# Patient Record
Sex: FEMALE | Race: WHITE | NOT HISPANIC OR LATINO
[De-identification: names, ages, dates, MRNs, and addresses within clinical notes are randomized per-mention and may not be internally consistent; named-entity substitution may affect disease eponyms.]

---

## 2020-08-10 PROBLEM — Z00.00 ENCOUNTER FOR PREVENTIVE HEALTH EXAMINATION: Status: ACTIVE | Noted: 2020-08-10

## 2020-08-11 ENCOUNTER — APPOINTMENT (OUTPATIENT)
Dept: SURGICAL ONCOLOGY | Facility: CLINIC | Age: 85
End: 2020-08-11
Payer: MEDICARE

## 2020-08-11 VITALS
OXYGEN SATURATION: 99 % | RESPIRATION RATE: 16 BRPM | SYSTOLIC BLOOD PRESSURE: 189 MMHG | DIASTOLIC BLOOD PRESSURE: 103 MMHG | TEMPERATURE: 98.1 F | HEART RATE: 88 BPM

## 2020-08-11 DIAGNOSIS — Z86.69 PERSONAL HISTORY OF OTHER DISEASES OF THE NERVOUS SYSTEM AND SENSE ORGANS: ICD-10-CM

## 2020-08-11 DIAGNOSIS — Z87.19 PERSONAL HISTORY OF OTHER DISEASES OF THE DIGESTIVE SYSTEM: ICD-10-CM

## 2020-08-11 DIAGNOSIS — K31.9 DISEASE OF STOMACH AND DUODENUM, UNSPECIFIED: ICD-10-CM

## 2020-08-11 DIAGNOSIS — R06.9 UNSPECIFIED ABNORMALITIES OF BREATHING: ICD-10-CM

## 2020-08-11 PROCEDURE — 99205 OFFICE O/P NEW HI 60 MIN: CPT

## 2020-08-11 RX ORDER — TRIAMCINOLONE ACETONIDE 55 MCG
AEROSOL WITH ADAPTER (GRAM) NASAL
Refills: 0 | Status: ACTIVE | COMMUNITY

## 2020-08-11 RX ORDER — LEVOTHYROXINE SODIUM 112 UG/1
112 TABLET ORAL
Qty: 30 | Refills: 0 | Status: ACTIVE | COMMUNITY
Start: 2020-04-13

## 2020-08-11 RX ORDER — DEXLANSOPRAZOLE 60 MG/1
60 CAPSULE, DELAYED RELEASE ORAL
Qty: 90 | Refills: 0 | Status: ACTIVE | COMMUNITY
Start: 2020-04-07

## 2020-08-11 NOTE — ASSESSMENT
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 I explained that breast cancer can be treated with 2 main surgical approaches. One is breast conserving therapy. The other is mastectomy with or without immediate reconstruction by a plastic surgeon. Breast conserving therapy involves wide excision of the involved area. Negative margins must be achieved with this wide excision. In addition, evaluation of the lymph nodes either with a sentinel lymph node biopsy or an axillary lymph node dissection may be required. This treatment usually requires postoperative radiation to the breast. Treatment with mastectomy also involves node dissection. Postoperative radiation therapy may be needed even after mastectomy in certain advanced cases.\par \par  \par

## 2020-08-11 NOTE — HISTORY OF PRESENT ILLNESS
[de-identified] : Ms. Hennessy is a 85 year old female who presents today for an initial consultation for newly diagnosed breast cancer.  She  was referred by Dr. Jose Juan Fu.Patient had recent CT scan to follow an ascending AA . A soft tissue mass was noted in the left breast. A mammogram was performed on July 27 which identified a 2.5 cm suspicious mass. In retrospect this was not felt by the patient.\par \par She underwent a biopsy of the left breast 2:00 revealing invasive ductal carcinoma, moderately differentiated (Er+Pr+Her2-).\par \par \par \par

## 2020-08-11 NOTE — CONSULT LETTER
[Consult Letter:] : I had the pleasure of evaluating your patient, [unfilled]. [Dear  ___] : Dear  [unfilled], [Please see my note below.] : Please see my note below. [Consult Closing:] : Thank you very much for allowing me to participate in the care of this patient.  If you have any questions, please do not hesitate to contact me. [Sincerely,] : Sincerely, [FreeTextEntry2] : Jose Juan Fu MD [FreeTextEntry3] : Eduardo Hurtado MD \par (O) [phone]\par (F) [phone]

## 2020-08-11 NOTE — PHYSICAL EXAM
[Normal] : supple, no neck mass and thyroid not enlarged [Normal Supraclavicular Lymph Nodes] : normal supraclavicular lymph nodes [Normal Axillary Lymph Nodes] : normal axillary lymph nodes [Normal] : oriented to person, place and time, with appropriate affect [FreeTextEntry1] : DT present for exam.  [de-identified] : Normal S1, S2. Regular rate and rhythm. [de-identified] : Complete breast exam performed in supine and upright positions. Examination of the right breast was essentially within normal limits. 1:00 axis of the left breast a firm very reported mass was identified with overlying ecchymosis secondary to biopsy .   [de-identified] : Clear breath sounds bilaterally, normal respiratory effort.

## 2020-08-28 ENCOUNTER — OUTPATIENT (OUTPATIENT)
Dept: OUTPATIENT SERVICES | Facility: HOSPITAL | Age: 85
LOS: 1 days | End: 2020-08-28
Payer: MEDICARE

## 2020-08-28 DIAGNOSIS — C50.919 MALIGNANT NEOPLASM OF UNSPECIFIED SITE OF UNSPECIFIED FEMALE BREAST: ICD-10-CM

## 2020-09-01 ENCOUNTER — RESULT REVIEW (OUTPATIENT)
Age: 85
End: 2020-09-01

## 2020-09-01 PROCEDURE — 88321 CONSLTJ&REPRT SLD PREP ELSWR: CPT

## 2020-09-17 ENCOUNTER — APPOINTMENT (OUTPATIENT)
Dept: ULTRASOUND IMAGING | Facility: IMAGING CENTER | Age: 85
End: 2020-09-17
Payer: MEDICARE

## 2020-09-17 ENCOUNTER — OUTPATIENT (OUTPATIENT)
Dept: OUTPATIENT SERVICES | Facility: HOSPITAL | Age: 85
LOS: 1 days | End: 2020-09-17
Payer: MEDICARE

## 2020-09-17 ENCOUNTER — RESULT REVIEW (OUTPATIENT)
Age: 85
End: 2020-09-17

## 2020-09-17 VITALS
HEIGHT: 64 IN | WEIGHT: 160.06 LBS | SYSTOLIC BLOOD PRESSURE: 137 MMHG | HEART RATE: 83 BPM | RESPIRATION RATE: 14 BRPM | TEMPERATURE: 98 F | OXYGEN SATURATION: 97 % | DIASTOLIC BLOOD PRESSURE: 93 MMHG

## 2020-09-17 DIAGNOSIS — C80.1 MALIGNANT (PRIMARY) NEOPLASM, UNSPECIFIED: ICD-10-CM

## 2020-09-17 DIAGNOSIS — C50.919 MALIGNANT NEOPLASM OF UNSPECIFIED SITE OF UNSPECIFIED FEMALE BREAST: ICD-10-CM

## 2020-09-17 DIAGNOSIS — H26.9 UNSPECIFIED CATARACT: Chronic | ICD-10-CM

## 2020-09-17 LAB
ANION GAP SERPL CALC-SCNC: 12 MMO/L — SIGNIFICANT CHANGE UP (ref 7–14)
BUN SERPL-MCNC: 22 MG/DL — SIGNIFICANT CHANGE UP (ref 7–23)
CALCIUM SERPL-MCNC: 9.3 MG/DL — SIGNIFICANT CHANGE UP (ref 8.4–10.5)
CHLORIDE SERPL-SCNC: 101 MMOL/L — SIGNIFICANT CHANGE UP (ref 98–107)
CO2 SERPL-SCNC: 27 MMOL/L — SIGNIFICANT CHANGE UP (ref 22–31)
CREAT SERPL-MCNC: 0.66 MG/DL — SIGNIFICANT CHANGE UP (ref 0.5–1.3)
GLUCOSE SERPL-MCNC: 86 MG/DL — SIGNIFICANT CHANGE UP (ref 70–99)
HCT VFR BLD CALC: 42.7 % — SIGNIFICANT CHANGE UP (ref 34.5–45)
HGB BLD-MCNC: 13.4 G/DL — SIGNIFICANT CHANGE UP (ref 11.5–15.5)
MCHC RBC-ENTMCNC: 30.4 PG — SIGNIFICANT CHANGE UP (ref 27–34)
MCHC RBC-ENTMCNC: 31.4 % — LOW (ref 32–36)
MCV RBC AUTO: 96.8 FL — SIGNIFICANT CHANGE UP (ref 80–100)
NRBC # FLD: 0 K/UL — SIGNIFICANT CHANGE UP (ref 0–0)
PLATELET # BLD AUTO: 245 K/UL — SIGNIFICANT CHANGE UP (ref 150–400)
PMV BLD: 10.1 FL — SIGNIFICANT CHANGE UP (ref 7–13)
POTASSIUM SERPL-MCNC: 4.2 MMOL/L — SIGNIFICANT CHANGE UP (ref 3.5–5.3)
POTASSIUM SERPL-SCNC: 4.2 MMOL/L — SIGNIFICANT CHANGE UP (ref 3.5–5.3)
RBC # BLD: 4.41 M/UL — SIGNIFICANT CHANGE UP (ref 3.8–5.2)
RBC # FLD: 13.4 % — SIGNIFICANT CHANGE UP (ref 10.3–14.5)
SODIUM SERPL-SCNC: 140 MMOL/L — SIGNIFICANT CHANGE UP (ref 135–145)
WBC # BLD: 5.28 K/UL — SIGNIFICANT CHANGE UP (ref 3.8–10.5)
WBC # FLD AUTO: 5.28 K/UL — SIGNIFICANT CHANGE UP (ref 3.8–10.5)

## 2020-09-17 PROCEDURE — 19285 PERQ DEV BREAST 1ST US IMAG: CPT | Mod: LT

## 2020-09-17 PROCEDURE — C1739: CPT

## 2020-09-17 PROCEDURE — 19285 PERQ DEV BREAST 1ST US IMAG: CPT

## 2020-09-17 PROCEDURE — 93010 ELECTROCARDIOGRAM REPORT: CPT

## 2020-09-17 NOTE — H&P PST ADULT - MARITAL STATUS
3 children, mother  89y/o brain tumor, father  79y/o lung problem , 1 sister  70y/o s/p valve replacement developed staph infection/

## 2020-09-17 NOTE — H&P PST ADULT - NEUROLOGICAL COMMENTS
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

## 2020-09-17 NOTE — H&P PST ADULT - GASTROINTESTINAL DETAILS
no masses palpable/no guarding/no organomegaly/no rebound tenderness/bowel sounds normal/no bruit/no rigidity/no distention/soft/nontender
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
## 2020-09-17 NOTE — H&P PST ADULT - NEGATIVE GENERAL SYMPTOMS
no polydipsia/no fatigue/no malaise/no sweating/no anorexia/no weight loss/no fever/no chills/no polyphagia/no polyuria

## 2020-09-17 NOTE — H&P PST ADULT - HISTORY OF PRESENT ILLNESS
86y/o female scheduled for left breast lumpectomy post adam  placement left axillary sentinel lymph node biopsy possible dissection on 9/24/2020.  Pt states, "hx of aortic aneurysm has routine cat scan, 7/2020 showed left lump breast lump, bx positive for malignancy."

## 2020-09-17 NOTE — H&P PST ADULT - NEGATIVE BREAST SYMPTOMS
no breast tenderness R/no nipple discharge R/no breast lump R/no breast tenderness L/no breast lump L/no nipple discharge L

## 2020-09-17 NOTE — H&P PST ADULT - RS GEN PE MLT RESP DETAILS PC
clear to auscultation bilaterally/good air movement/breath sounds equal/no chest wall tenderness/no rhonchi/no wheezes/no rales/no intercostal retractions/respirations non-labored

## 2020-09-17 NOTE — H&P PST ADULT - NSICDXPASTMEDICALHX_GEN_ALL_CORE_FT
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

## 2020-09-17 NOTE — H&P PST ADULT - NEGATIVE GENERAL GENITOURINARY SYMPTOMS
no flank pain L/normal urinary frequency/no flank pain R/no bladder infections/no hematuria/no dysuria

## 2020-09-17 NOTE — H&P PST ADULT - NSICDXPROBLEM_GEN_ALL_CORE_FT
PROBLEM DIAGNOSES  Problem: Malignant neoplasm  Assessment and Plan: Pt scheduled for left breast lumpectomy post adam  placement left axillary sentinel lymph node biopsy possible dissection on 9/24/2020.  labs done results pending, ekg done.  Hibiclens provided: written and verbal instructions given with teach back, able to verbalize understanding.  Preop teaching done, pt able to verbalize understanding.        PROBLEM DIAGNOSES  Problem: Malignant neoplasm  Assessment and Plan: Pt scheduled for left breast lumpectomy post adam  placement left axillary sentinel lymph node biopsy possible dissection on 9/24/2020.  labs done results pending, ekg done.  Hibiclens provided: written and verbal instructions given with teach back, able to verbalize understanding.  Preop teaching done, pt able to verbalize understanding.   meds day of surgery synthroid, propranolol, dexilant zyrtec  OR booking notified of sulfa and PCN allergy

## 2020-09-18 PROBLEM — C80.1 MALIGNANT (PRIMARY) NEOPLASM, UNSPECIFIED: Chronic | Status: ACTIVE | Noted: 2020-09-17

## 2020-09-18 PROBLEM — R25.1 TREMOR, UNSPECIFIED: Chronic | Status: ACTIVE | Noted: 2020-09-17

## 2020-09-18 PROBLEM — Z86.19 PERSONAL HISTORY OF OTHER INFECTIOUS AND PARASITIC DISEASES: Chronic | Status: ACTIVE | Noted: 2020-09-17

## 2020-09-21 ENCOUNTER — APPOINTMENT (OUTPATIENT)
Dept: DISASTER EMERGENCY | Facility: CLINIC | Age: 85
End: 2020-09-21

## 2020-09-21 ENCOUNTER — APPOINTMENT (OUTPATIENT)
Dept: SURGICAL ONCOLOGY | Facility: CLINIC | Age: 85
End: 2020-09-21

## 2020-09-21 DIAGNOSIS — Z01.818 ENCOUNTER FOR OTHER PREPROCEDURAL EXAMINATION: ICD-10-CM

## 2020-09-21 PROBLEM — M50.20 OTHER CERVICAL DISC DISPLACEMENT, UNSPECIFIED CERVICAL REGION: Chronic | Status: ACTIVE | Noted: 2020-09-17

## 2020-09-21 PROBLEM — B02.29 OTHER POSTHERPETIC NERVOUS SYSTEM INVOLVEMENT: Chronic | Status: ACTIVE | Noted: 2020-09-17

## 2020-09-21 PROBLEM — M51.26 OTHER INTERVERTEBRAL DISC DISPLACEMENT, LUMBAR REGION: Chronic | Status: ACTIVE | Noted: 2020-09-17

## 2020-09-22 LAB — SARS-COV-2 N GENE NPH QL NAA+PROBE: NOT DETECTED

## 2020-09-23 ENCOUNTER — RESULT REVIEW (OUTPATIENT)
Age: 85
End: 2020-09-23

## 2020-09-23 VITALS
WEIGHT: 160.06 LBS | TEMPERATURE: 98 F | SYSTOLIC BLOOD PRESSURE: 170 MMHG | HEART RATE: 80 BPM | DIASTOLIC BLOOD PRESSURE: 84 MMHG | OXYGEN SATURATION: 97 % | HEIGHT: 64 IN | RESPIRATION RATE: 14 BRPM

## 2020-09-24 ENCOUNTER — RESULT REVIEW (OUTPATIENT)
Age: 85
End: 2020-09-24

## 2020-09-24 ENCOUNTER — APPOINTMENT (OUTPATIENT)
Dept: MAMMOGRAPHY | Facility: IMAGING CENTER | Age: 85
End: 2020-09-24
Payer: MEDICARE

## 2020-09-24 ENCOUNTER — APPOINTMENT (OUTPATIENT)
Dept: SURGICAL ONCOLOGY | Facility: AMBULATORY SURGERY CENTER | Age: 85
End: 2020-09-24

## 2020-09-24 ENCOUNTER — OUTPATIENT (OUTPATIENT)
Dept: OUTPATIENT SERVICES | Facility: HOSPITAL | Age: 85
LOS: 1 days | End: 2020-09-24
Payer: MEDICARE

## 2020-09-24 ENCOUNTER — APPOINTMENT (OUTPATIENT)
Dept: NUCLEAR MEDICINE | Facility: IMAGING CENTER | Age: 85
End: 2020-09-24
Payer: MEDICARE

## 2020-09-24 ENCOUNTER — OUTPATIENT (OUTPATIENT)
Dept: OUTPATIENT SERVICES | Facility: HOSPITAL | Age: 85
LOS: 1 days | Discharge: HOME | End: 2020-09-24
Payer: MEDICARE

## 2020-09-24 VITALS
HEART RATE: 72 BPM | RESPIRATION RATE: 14 BRPM | DIASTOLIC BLOOD PRESSURE: 67 MMHG | SYSTOLIC BLOOD PRESSURE: 123 MMHG | OXYGEN SATURATION: 98 %

## 2020-09-24 DIAGNOSIS — C50.919 MALIGNANT NEOPLASM OF UNSPECIFIED SITE OF UNSPECIFIED FEMALE BREAST: ICD-10-CM

## 2020-09-24 DIAGNOSIS — H26.9 UNSPECIFIED CATARACT: Chronic | ICD-10-CM

## 2020-09-24 PROCEDURE — 76098 X-RAY EXAM SURGICAL SPECIMEN: CPT

## 2020-09-24 PROCEDURE — 76098 X-RAY EXAM SURGICAL SPECIMEN: CPT | Mod: 26

## 2020-09-24 PROCEDURE — A9541: CPT

## 2020-09-24 PROCEDURE — 88307 TISSUE EXAM BY PATHOLOGIST: CPT | Mod: 26

## 2020-09-24 PROCEDURE — 88305 TISSUE EXAM BY PATHOLOGIST: CPT | Mod: 26

## 2020-09-24 PROCEDURE — 19301 PARTIAL MASTECTOMY: CPT | Mod: LT

## 2020-09-24 RX ORDER — PROPRANOLOL HCL 160 MG
1 CAPSULE, EXTENDED RELEASE 24HR ORAL
Qty: 0 | Refills: 0 | DISCHARGE

## 2020-09-24 RX ORDER — TRIAMCINOLONE ACETONIDE 55 MCG
1 AEROSOL, SPRAY (GRAM) NASAL
Qty: 0 | Refills: 0 | DISCHARGE

## 2020-09-24 RX ORDER — DEXLANSOPRAZOLE 30 MG/1
1 CAPSULE, DELAYED RELEASE ORAL
Qty: 0 | Refills: 0 | DISCHARGE

## 2020-09-24 RX ORDER — CETIRIZINE HYDROCHLORIDE 10 MG/1
1 TABLET ORAL
Qty: 0 | Refills: 0 | DISCHARGE

## 2020-09-24 RX ORDER — LEVOTHYROXINE SODIUM 125 MCG
1 TABLET ORAL
Qty: 0 | Refills: 0 | DISCHARGE

## 2020-09-24 NOTE — ASU DISCHARGE PLAN (ADULT/PEDIATRIC) - MEDICATION INSTRUCTIONS
Please take Tylenol every 6 hours as needed for pain. Please do not take more than 4000mg Tylenol in a 24 hour period.

## 2020-09-24 NOTE — ASU PREOP CHECKLIST - WEIGHT IN LBS
History of Present Illness


Service


Ophthalmology


Consult Requested By





Reason for Consult


left orbital fracture


Primary Care Physician


Hellen Rai MD


Diagnoses:  


History of Present Illness


88 yo F with a h/o of HTN, Hyperlipidemia, DM and dementia who is brought to 

the ED by EMS after a fall with significant left-sided facial swelling and 

hematoma.  Patient currently [facility] resident, fall was unwitnessed however patient 

does not believe she lost consciousness. CT Maxillofacial with displaced 

fracture through floor of left orbit with slight herniation of inferior oblique 

musculature, left nasal fracture. Patient complains of pain around her left eye 

but no change in vision. Pt has dementia and cannot remember if she has any 

past ocular history or surgeries.





Past Family Social History


Allergies:  


Coded Allergies:  


     Milk Containing Products (Verified  Allergy, Unknown, 4/28/18)


     codeine (Verified  Allergy, Unknown, 4/28/18)


     erythromycin base (Verified  Allergy, Unknown, 4/28/18)


     pollen extracts (Verified  Allergy, Unknown, 4/28/18)





Physical Exam


Vital Signs





Vital Signs








  Date Time  Temp Pulse Resp B/P (MAP) Pulse Ox O2 Delivery O2 Flow Rate FiO2


 


5/2/18 08:41   20 136/74 (94)    


 


5/2/18 04:00 97.9 78 18 150/86 (107) 97   


 


5/2/18 00:00 98.1 78 18 146/68 (94) 95   


 


5/1/18 20:00 98.6 69 18 154/67 (96) 97   


 


5/1/18 16:00 98.4 80 19 124/70 (88) 100   


 


5/1/18 12:00 98.4 71 19 137/64 (88) 99   








Physical Exam


Va cc at near OD 20/100, OS 20/100


EOM full OU, no diplopia


CVF unable


Pupils 2-1 no APD OU


IOP normal to palpation OU





Anterior exam


OD - normal eyelid, C/S W&Q, K clear, AC deep, pupil round, NS


OS - normal eyelid, C/S W&Q, K clear, AC deep, pupil round, NS


Result Diagram:  


4/30/18 1119                                                                   

             4/30/18 1119








Assessment and Plan


Problem List:  


(1) Fracture of left orbital floor


ICD Codes:  S02.32XA - Fracture of orbital floor, left side, initial encounter 

for closed fracture


Status:  Acute


Plan:  No ocular injury seen on exam. Patient can follow up as outpatient PRN.














Angela Roldan MD May 2, 2018 12:00 160

## 2020-09-24 NOTE — ASU DISCHARGE PLAN (ADULT/PEDIATRIC) - FOLLOW UP APPOINTMENTS
911 or go to the nearest Emergency Room [facility] ([facility]):

## 2020-09-24 NOTE — BRIEF OPERATIVE NOTE - OPERATION/FINDINGS
Patient presented for L breast lumpectomy with axillary sentinel node biopsy post-adam  reflector placement. Mass was identified with adam  marker and confirmed again intraoperatively. A radionucleotide tracer was injected and a probe was used to identify axillary lymph nodes for biopsy. The nodes were excised and sent to pathology in formalin. Sutures were used to reapproximate the axillary biopsy incision. An elliptical incision was made for L breast lumpectomy. The mass was identified and reconfirmed with adam  probe prior to excision. Additional margins were obtained from superior, medial, inferior, lateral, and deep margins and sent to pathology. The subcutaneous tissue and skin were reapproximated with suture in layers.

## 2020-09-24 NOTE — BRIEF OPERATIVE NOTE - SPECIMENS
1. axillary sentinel lymph nodes   2. L breast mass (long lateral suture, short superior suture, double deep suture)   3. superior margin   4. medial margin   5. inferior margin   6. lateral margin   7. deep margin

## 2020-09-24 NOTE — ASU DISCHARGE PLAN (ADULT/PEDIATRIC) - CALL YOUR DOCTOR IF YOU HAVE ANY OF THE FOLLOWING:
Bleeding that does not stop/Wound/Surgical Site with redness, or foul smelling discharge or pus Pain not relieved by Medications/Nausea and vomiting that does not stop/Wound/Surgical Site with redness, or foul smelling discharge or pus/Inability to tolerate liquids or foods/Swelling that gets worse/Fever greater than (need to indicate Fahrenheit or Celsius)/Bleeding that does not stop

## 2020-09-24 NOTE — ASU DISCHARGE PLAN (ADULT/PEDIATRIC) - BATHING
Do not submerge in water May shower starting tomorrow, allow water to run over but not directly on incision. Pat dry/Do not submerge in water

## 2020-09-24 NOTE — ASU DISCHARGE PLAN (ADULT/PEDIATRIC) - ASU DC SPECIAL INSTRUCTIONSFT
[facility]  CANCER  I  N  S  T  I  T  U  T E     Department of Surgery  Division of Surgical Oncology    Jono Odom M.D., LOU.POONAM.  Chief, Division of Surgical Oncology    Hardeep Herrera M.D., F.A.C.S., F.A.S.VESTA.  Associate , Department of Surgery    Sergei Mendoza M.D., F.A.C.S.  Freddy Madrid M.D., F.A.C.S.  Gil Angulo M.D., F.A.C.S.  Bo Castillo M.D., F.A.C.S.  Freddy Hector M.D., F.A.C.S.  Eduardo Hurtado M.D., F.IONAC.S.      Breast Biopsy/Lumpectomy Post-operative Instructions  1.	Supportive bra- Please bring a sports/athletic bra with you on the day of your surgery.  You will wear it home from the hospital.  You should wear the supportive bra continuously for 48 hours after the procedure, including wearing it to sleep.  Therefore, you may wear your regular bra.  You may remove the bra to shower.  The sports bra will provide support, decrease the amount of swelling at the biopsy site, and make your recovery more comfortable.    2.	Wound dressing- There is a dressing on the wound, which consists of 2 layers.  The outer layer is a clear plastic dressing (called Tegaderm) which is waterproof.  This should remain in place for 2 days post-operatively.  On the 2nd post-operative day, you should remove the clear plastic Tegaderm dressing.  Underneath the Tegaderm dressing, there are white surgical tapes (called steri strips) which are directly adherent to the skin.  These should remain on the skin, and will peel off naturally.  All of the stitches are “internal” and will dissolve naturally.    3.	Showering/Bathing- You may shower over the dressing the very next day after surgery.  Allow the water to run over the dressing, but don not scrub the area.  It is best not to sit in a bathtub or swimming pool for at least one week after surgery.    4.	Activity level- You may resume most normal daily activity as tolerated, but avoid strenuous activities such as aerobics, jogging, exercising or heavy lifting for at least 1 week after surgery.  You may return to work in 1-2 days after surgery.  You may drive as long as you are not taking any prescription pain medication.    5.	Pain Medication- You may take the prescribed medication, or you may take extra-strength Tylenol as needed.  Please don't take aspirin, Motrin, Advil or any other anti-inflammatory medications, as these medications may cause bleeding or bruising.    6.	Follow-up Appointment- Please call the office to schedule your post-operative appointment which should be approximately 10 days after your surgery. Office [phone]    7.	Bruising/Bleeding/Swelling- It is normal for there to be some bruising and swelling at the breast biopsy site, and there may be some staining of blood on to the dressing.  Some discomfort at the surgical site is expected.  If your symptoms seem excessive, or if you have any question or concerns, please call the office.      Anesthesia Precautions:  For the next 12 hours do not:   •	drive a car,  •	drink alcohol, beer, or wine,   •	make important personal or business decisions  Diet:   •	Progress diet slowly unless otherwise indicated    Physician Notification  •	Any Prescription issues  •	Bleeding that does not stop  •	Persistent nausea and vomiting  •	Pain not relieved by medications  •	Fever greater than 101®F  •	Inability to tolerate liquids or foods  •	Unable to urinate after 8 hours  Discharge and Disposition  •	Discharge to home/ group home/assisted living  •	Accompanied by Family/Spouse/ Parents/ Significant Other/ Friend/ and or Caregiver    Follow Up Care:  •	In the event that you develop a complication and you are unable to reach your own physician, you may contact:  911 or go to the nearest Emergency Room.   •	Please call your surgeon to schedule your follow up appointment [phone] [facility]  CANCER  I  N  S  T  I  T  U  T E     Department of Surgery  Division of Surgical Oncology    Jono Odom M.D., LOU.POONAM.  Chief, Division of Surgical Oncology    Hardeep Herrera M.D., F.A.C.S., F.A.S.VESTA.  Associate , Department of Surgery    Sergei Mendoza M.D., F.A.C.S.  Freddy Madrid M.D., F.A.C.S.  Gil Angulo M.D., F.A.C.S.  Bo Castillo M.D., F.A.C.S.  Freddy Hector M.D., F.A.C.S.  Eduardo Hurtado M.D., F.IONAC.S.      Breast Biopsy/Lumpectomy Post-operative Instructions  1.	Supportive bra- Please bring a sports/athletic bra with you on the day of your surgery.  You will wear it home from the hospital.  You should wear the supportive bra continuously for 48 hours after the procedure, including wearing it to sleep.  Therefore, you may wear your regular bra.  You may remove the bra to shower.  The sports bra will provide support, decrease the amount of swelling at the biopsy site, and make your recovery more comfortable.    2.	Wound dressing- There is a dressing on the wound, which consists of 2 layers.  The outer layer is a clear plastic dressing (called Tegaderm) which is waterproof).  This should remain in place for 2 days post-operatively.  On the 2nd post-operative day, you should remove the clear plastic Tegaderm dressing.  Underneath the Tegaderm dressing, there are white surgical tapes (called steri strips) which are directly adherent to the skin.  These should remain on the skin, and will peel off naturally.  All of the stitches are “internal” and will dissolve naturally.    3.	Showering/Bathing- You may shower over the dressing the very next day after surgery.  Allow the water to run over the dressing, but don not scrub the area.  It is best not to sit in a bathtub or swimming pool for at least one week after surgery.    4.	Activity level- You may resume most normal daily activity as tolerated, but avoid strenuous activities such as aerobics, jogging, exercising or heavy lifting for at least 1 week after surgery.  You may return to work in 1-2 days after surgery.  You may drive as long as you are not taking any prescription pain medication.    5.	Pain Medication- You may take the prescribed medication, or you may take extra-strength Tylenol as needed.  Please don't take aspirin, Motrin, Advil or any other anti-inflammatory medications, as these medications may cause bleeding or bruising.    6.	Follow-up Appointment- Please call the office to schedule your post-operative appointment which should be approximately 10 days after your surgery. Office [phone]    7.	Bruising/Bleeding/Swelling- It is normal for there to be some bruising and swelling at the breast biopsy site, and there may be some staining of blood on to the dressing.  Some discomfort at the surgical site is expected.  If your symptoms seem excessive, or if you have any question or concerns, please call the office.      Anesthesia Precautions:  For the next 12 hours do not:   •	drive a car,  •	drink alcohol, beer, or wine,   •	make important personal or business decisions  Diet:   •	Progress diet slowly unless otherwise indicated    Physician Notification  •	Any Prescription issues  •	Bleeding that does not stop  •	Persistent nausea and vomiting  •	Pain not relieved by medications  •	Fever greater than 101®F  •	Inability to tolerate liquids or foods  •	Unable to urinate after 8 hours  Discharge and Disposition  •	Discharge to home/ group home/assisted living  •	Accompanied by Family/Spouse/ Parents/ Significant Other/ Friend/ and or Caregiver    Follow Up Care:  •	In the event that you develop a complication and you are unable to reach your own physician, you may contact:  911 or go to the nearest Emergency Room.   •	Please call your surgeon to schedule your follow up appointment [phone]

## 2020-10-12 ENCOUNTER — APPOINTMENT (OUTPATIENT)
Dept: SURGICAL ONCOLOGY | Facility: CLINIC | Age: 85
End: 2020-10-12
Payer: MEDICARE

## 2020-10-12 VITALS
TEMPERATURE: 98 F | OXYGEN SATURATION: 98 % | SYSTOLIC BLOOD PRESSURE: 155 MMHG | HEART RATE: 84 BPM | DIASTOLIC BLOOD PRESSURE: 82 MMHG | RESPIRATION RATE: 16 BRPM

## 2020-10-12 PROCEDURE — 99024 POSTOP FOLLOW-UP VISIT: CPT

## 2020-10-12 NOTE — HISTORY OF PRESENT ILLNESS
[de-identified] : Ms. Hennessy is a 85 year old female who presents today for an initial post op visit. Pt is s/p left breast lumpectomy with sentinel node excision. \par Final Pathology: \par Final Diagnosis\par 1. Lymph nodes, left axillary sentinel, sentinel node biopsies\par - Metastatic carcinoma in 1 of 4 nodes (1/4) (1.0 cm with\par extracapsular extension)\par 2. Breast, left, lumpectomy\par - Invasive duct carcinoma, moderately differentiated (3.4\par cm) with adjacent microscopic satellite foci\par - Ductal carcinoma in situ focal, intermediate nuclear\par grade, solid with necrosis\par 3. Breast, left additional superior margin, margin biopsy\par - Unremarkable adipose tissue\par 4. Breast, additional medial margin, mergin biopsy\par - Unremarkable adipose tissue\par 5. Breast, additional inferior margin, margin biopsy\par - Focal non-proliferative fibrocystic change\par 6. Breast, additional lateral margin, margin biopsy\par - Unremarkable adipose tissue\par 7. Breast, additional deep margin, margin biopsy\par - Unremarkable adipose tissue and skeletal muscle\par \par Pertinent Hx:\par She  was referred by Dr. Jose Juan Fu.Patient had recent CT scan to follow an ascending AA . A soft tissue mass was noted in the left breast. A mammogram was performed on July 27 which identified a 2.5 cm suspicious mass. In retrospect this was not felt by the patient.\par \par She underwent a biopsy of the left breast 2:00 revealing invasive ductal carcinoma, moderately differentiated (Er+Pr+Her2-).\par \par Today the pt was w/o any complaints. Denies palpable breast masses, nipple discharge, skin changes, inversion of breast pain. Denies constitutional symptoms\par

## 2020-10-12 NOTE — PHYSICAL EXAM
[FreeTextEntry1] : I, Reece Roche was present for the physical exam.\par  [de-identified] : Incisions healing well. No evidence of infection. No hematoma. Mild postop fluid collection.

## 2020-10-12 NOTE — ASSESSMENT
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 \par \par Plan:\par Follow up with medical oncology. \par

## 2020-10-12 NOTE — CONSULT LETTER
[Dear  ___] : Dear  [unfilled], [Consult Letter:] : I had the pleasure of evaluating your patient, [unfilled]. [Please see my note below.] : Please see my note below. [Consult Closing:] : Thank you very much for allowing me to participate in the care of this patient.  If you have any questions, please do not hesitate to contact me. [Sincerely,] : Sincerely, [FreeTextEntry2] : Jose Juan Fu MD [FreeTextEntry3] : Eduardo Hurtado MD \par (O) [phone]\par (F) [phone]

## 2020-10-12 NOTE — ADDENDUM
[FreeTextEntry1] : I, Reece Roche, acted soley as a scribe for Dr. Eduardo Hurtado on this date 10/12/2020.

## 2020-10-19 ENCOUNTER — OUTPATIENT (OUTPATIENT)
Dept: OUTPATIENT SERVICES | Facility: HOSPITAL | Age: 85
LOS: 1 days | Discharge: HOME | End: 2020-10-19

## 2020-10-19 DIAGNOSIS — C50.919 MALIGNANT NEOPLASM OF UNSPECIFIED SITE OF UNSPECIFIED FEMALE BREAST: ICD-10-CM

## 2020-10-19 DIAGNOSIS — H26.9 UNSPECIFIED CATARACT: Chronic | ICD-10-CM

## 2020-10-23 ENCOUNTER — APPOINTMENT (OUTPATIENT)
Dept: HEMATOLOGY ONCOLOGY | Facility: CLINIC | Age: 85
End: 2020-10-23
Payer: MEDICARE

## 2020-10-23 VITALS
OXYGEN SATURATION: 97 % | RESPIRATION RATE: 16 BRPM | SYSTOLIC BLOOD PRESSURE: 180 MMHG | DIASTOLIC BLOOD PRESSURE: 119 MMHG | TEMPERATURE: 98.7 F | HEIGHT: 62.6 IN | WEIGHT: 162.26 LBS | HEART RATE: 78 BPM | BODY MASS INDEX: 29.11 KG/M2

## 2020-10-23 DIAGNOSIS — Z87.828 PERSONAL HISTORY OF OTHER (HEALED) PHYSICAL INJURY AND TRAUMA: ICD-10-CM

## 2020-10-23 PROCEDURE — 99205 OFFICE O/P NEW HI 60 MIN: CPT

## 2020-10-25 PROBLEM — Z87.828 HISTORY OF MOTOR VEHICLE ACCIDENT: Status: RESOLVED | Noted: 2020-10-25 | Resolved: 2020-10-25

## 2020-10-25 NOTE — ASSESSMENT
[FreeTextEntry1] : She is a 86 y/o  F s/p lumpectomy with sentinel lymph node biopsy T2N1 that is ER/ KS positive [id number] negative. We reviewed her pathology. We reviewed the significance of ER positive, LN negative breast cancer and the risk of breast cancer recurrence. We reviewed the role of adjuvant therapy to reduce the risk of breast cancer recurrence. We reviewed chemotherapy and its potential side effects that may be more in elderly patients. She is willing to consider any adjuvant therapy that will reduce the risk of breast cancer. We reviewed the use of OncotypeDx to evaluate the benefit of adjuvant chemotherapy and will review CMF if her score is high in the setting of LN positive disease. She is willing to have RT evaluation after lumpectomy. \par \par We reviewed endocrine therapy. We reviewed the different endocrine options. We reviewed anastrozole one tablet a day for up to 5 to 10 years. We reviewed the potential side effects of endocrine therapy including but not limited to: hot flash, GI upset, bone stiffness/ arthralgias, fatigue, and decrease in bone density. We reviewed supportive measures to decrease these side effects. We reviewed bone health with calcium and Vitamin D supplementation. Written information on anastrozole was provided to her. Questions were answered to her satisfaction. She understands if she experiences any intolerable side effects, we could switch endocrine therapies. She consented to anastrozole. She will have follow up after Oncotype returns.
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
## 2020-10-25 NOTE — PHYSICAL EXAM
[Restricted in physically strenuous activity but ambulatory and able to carry out work of a light or sedentary nature] : Status 1- Restricted in physically strenuous activity but ambulatory and able to carry out work of a light or sedentary nature, e.g., light house work, office work [Normal] : affect appropriate [de-identified] : breast lumpectomy and sentinel lymph node site healed  [de-identified] : resting tremors over the BUE; strength 4/5 BUE and BLE

## 2020-10-25 NOTE — CONSULT LETTER
[Dear  ___] : Dear  [unfilled], [Consult Letter:] : I had the pleasure of evaluating your patient, [unfilled]. [( Thank you for referring [unfilled] for consultation for _____ )] : Thank you for referring [unfilled] for consultation for [unfilled] [Please see my note below.] : Please see my note below. [Consult Closing:] : Thank you very much for allowing me to participate in the care of this patient.  If you have any questions, please do not hesitate to contact me. [Sincerely,] : Sincerely, [FreeTextEntry2] : Eduardo Hurtado MD\par [address] [place] [address]\[place] [address] [FreeTextEntry3] : Walker Smyth MD\par Attending\par [facility]\par

## 2020-10-25 NOTE — REVIEW OF SYSTEMS
[Joint Pain] : joint pain [Joint Stiffness] : no joint stiffness [Muscle Pain] : no muscle pain [Muscle Weakness] : no muscle weakness [Confused] : no confusion [Dizziness] : no dizziness [Fainting] : no fainting [Difficulty Walking] : no difficulty walking [Negative] : Allergic/Immunologic [FreeTextEntry9] : neck, shoulder and back [de-identified] : tremors over the RUE more then LUE

## 2020-10-30 ENCOUNTER — OUTPATIENT (OUTPATIENT)
Dept: OUTPATIENT SERVICES | Facility: HOSPITAL | Age: 85
LOS: 1 days | Discharge: HOME | End: 2020-10-30
Payer: MEDICARE

## 2020-10-30 ENCOUNTER — APPOINTMENT (OUTPATIENT)
Dept: RADIATION ONCOLOGY | Facility: CLINIC | Age: 85
End: 2020-10-30
Payer: MEDICARE

## 2020-10-30 VITALS
WEIGHT: 165.9 LBS | BODY MASS INDEX: 29.77 KG/M2 | SYSTOLIC BLOOD PRESSURE: 150 MMHG | DIASTOLIC BLOOD PRESSURE: 99 MMHG | HEIGHT: 62.6 IN | TEMPERATURE: 96.8 F | RESPIRATION RATE: 16 BRPM | OXYGEN SATURATION: 99 % | HEART RATE: 82 BPM

## 2020-10-30 DIAGNOSIS — H26.9 UNSPECIFIED CATARACT: Chronic | ICD-10-CM

## 2020-10-30 DIAGNOSIS — R25.1 TREMOR, UNSPECIFIED: ICD-10-CM

## 2020-10-30 PROCEDURE — 99214 OFFICE O/P EST MOD 30 MIN: CPT | Mod: 25

## 2020-10-30 PROCEDURE — 99205 OFFICE O/P NEW HI 60 MIN: CPT | Mod: 25

## 2020-10-30 NOTE — HISTORY OF PRESENT ILLNESS
[FreeTextEntry1] : Florence Hennessy is a 85 year old female seen with friend\par \par Diagnosis: newly diagnosed stage IB [id number] ER/[place] + Her2- G2 invasive ductal carcinoma of the left breast s/p lumpectomy now here to discuss adjuvant radiation options. 3.4cm breast lesion with 1cm metastatic lymph node with extracapsular extension.\par \par Her HPI is as follows:\par \par She has an ascending aortic aneursym which was being followed on CT chest on 7/16/20 which noted 1.9 cm left breast nodule with slightly irregular border. Diagnostic mammo strongly recommended and done 7/27/20 with  BIRADS 5. A 2.5 cm mass on left breast, highly suggestive of malignancy with biopsy recommended.  She underwent a left breast core biopsy at 2:00 on 8/3/20. Final pathology demonstrated a 1.3 cm invasive moderately differentiated  ductal carcinoma with Constantin score 6/9. No DCIS. ER  MA 41-50 Her 2 negative\par \par On 9/24/20, she had a left lumpectomy and left axillary sentinel lymph node biopsy. Final pathology noted 3.4 cm invasive ductal carcinoma , moderately differentiated with adjacent microscopic satellite foci. DCIS focal, intermediate nuclear grade, solid with necrosis. There was a metastatic carcinoma in 1 of 4 nodes ER+  % MA+ 41-50% Her 2 negative. \par \par She consulted with Dr. Smyth regarding medical oncology options. Oncotype score pending. Today she is feeling well. She does endorse some axillary soreness that does not require any analgesics. Has occasional back pain from shingles since 2015  She is taking Anastrozole QOD as prescribed. \par \par PMH: right hand essential tremor\par hypertension\par post Herpetic neuralgia around left breast and shoulder area (in treatment area)\par allergic to many antibiotics\par \par She is keeping busy with her volunteer and fundraising activities.
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
## 2020-10-30 NOTE — REASON FOR VISIT
[Consideration of Curative Therapy] : consideration of curative therapy for [Breast Cancer] : breast cancer [Friend] : friend

## 2020-10-30 NOTE — VITALS
[Maximal Pain Intensity: 3/10] : 3/10 [Least Pain Intensity: 0/10] : 0/10 [Pain Description/Quality: ___] : Pain description/quality: [unfilled] [Pain Duration: ___] : Pain duration: [unfilled] [Pain Location: ___] : Pain Location: [unfilled] [NoTreatment Scheduled] : no treatment scheduled [90: Able to carry normal activity; minor signs or symptoms of disease.] : 90: Able to carry normal activity; minor signs or symptoms of disease.  [ECOG Performance Status: 1 - Restricted in physically strenuous activity but ambulatory and able to carry out work of a light or sedentary nature] : Performance Status: 1 - Restricted in physically strenuous activity but ambulatory and able to carry out work of a light or sedentary nature, e.g., light house work, office work [5 - Distress Level] : Distress Level: 5 [Patient given social work contact information and resource sheet] : Patient was given social work contact information and resource sheet [Pain Interferes with ADLs] : Pain does not interfere with activities of daily living

## 2020-11-05 ENCOUNTER — NON-APPOINTMENT (OUTPATIENT)
Age: 85
End: 2020-11-05

## 2020-11-05 ENCOUNTER — APPOINTMENT (OUTPATIENT)
Dept: HEMATOLOGY ONCOLOGY | Facility: CLINIC | Age: 85
End: 2020-11-05
Payer: MEDICARE

## 2020-11-05 PROCEDURE — 99442: CPT | Mod: 95

## 2020-11-06 PROCEDURE — 77263 THER RADIOLOGY TX PLNG CPLX: CPT

## 2020-11-25 ENCOUNTER — NON-APPOINTMENT (OUTPATIENT)
Age: 85
End: 2020-11-25

## 2020-11-25 PROCEDURE — 77333 RADIATION TREATMENT AID(S): CPT | Mod: 26

## 2020-12-07 PROCEDURE — 77300 RADIATION THERAPY DOSE PLAN: CPT | Mod: 26

## 2020-12-07 PROCEDURE — 77301 RADIOTHERAPY DOSE PLAN IMRT: CPT | Mod: 26

## 2020-12-07 PROCEDURE — 77338 DESIGN MLC DEVICE FOR IMRT: CPT | Mod: 26

## 2020-12-15 ENCOUNTER — NON-APPOINTMENT (OUTPATIENT)
Age: 85
End: 2020-12-15

## 2020-12-15 PROCEDURE — 77387C: CUSTOM

## 2020-12-15 PROCEDURE — 77427 RADIATION TX MANAGEMENT X5: CPT

## 2020-12-15 NOTE — PHYSICAL EXAM
[General Appearance - Alert] : alert [General Appearance - In No Acute Distress] : in no acute distress [Skin Color & Pigmentation] : normal skin color and pigmentation

## 2020-12-15 NOTE — DISEASE MANAGEMENT
[Pathological] : TNM Stage: p [IB] : IB [TTNM] : 2 [NTNM] : 1 [MTNM] : x [de-identified] : Left breast/nodes

## 2020-12-15 NOTE — HISTORY OF PRESENT ILLNESS
[FreeTextEntry1] : Patient is a 85 year old female seen with friend\par \par Diagnosis: newly diagnosed stage IB [id number] ER/IN + Her2- G2 invasive ductal carcinoma of the left breast s/p lumpectomy now here to discuss adjuvant radiation options. 3.4cm breast lesion with 1cm metastatic lymph node with extracapsular extension.\par \par 12/15/20-1/16 fx\par Feeling well. Tolerated first tx. Discussed skin care and expectations for treatment.

## 2020-12-16 PROCEDURE — 77387C: CUSTOM

## 2020-12-17 PROCEDURE — 77387C: CUSTOM

## 2020-12-18 PROCEDURE — 77387C: CUSTOM

## 2020-12-21 PROCEDURE — 77387C: CUSTOM

## 2020-12-22 ENCOUNTER — NON-APPOINTMENT (OUTPATIENT)
Age: 85
End: 2020-12-22

## 2020-12-22 PROCEDURE — 77387C: CUSTOM

## 2020-12-22 PROCEDURE — 77427 RADIATION TX MANAGEMENT X5: CPT

## 2020-12-22 NOTE — PHYSICAL EXAM
[General Appearance - Alert] : alert [General Appearance - In No Acute Distress] : in no acute distress [Skin Color & Pigmentation] : normal skin color and pigmentation [Normal] : no respiratory distress, lungs were clear to auscultation bilaterally [Respiration, Rhythm And Depth] : normal respiratory rhythm and effort [Auscultation Breath Sounds / Voice Sounds] : lungs were clear to auscultation bilaterally [de-identified] : mild skin changes to treatment area

## 2020-12-22 NOTE — REVIEW OF SYSTEMS
[Dermatitis Radiation: Grade 0] : Dermatitis Radiation: Grade 0 [Fatigue: Grade 1 - Fatigue relieved by rest] : Fatigue: Grade 1 - Fatigue relieved by rest

## 2020-12-22 NOTE — DISEASE MANAGEMENT
[Pathological] : TNM Stage: p [IB] : IB [TTNM] : 2 [NTNM] : 1 [MTNM] : x [de-identified] : Left breast/nodes

## 2020-12-23 PROCEDURE — 77387C: CUSTOM

## 2020-12-24 PROCEDURE — 77387C: CUSTOM

## 2020-12-28 PROCEDURE — 77387C: CUSTOM

## 2020-12-29 ENCOUNTER — NON-APPOINTMENT (OUTPATIENT)
Age: 85
End: 2020-12-29

## 2020-12-29 PROCEDURE — 77387C: CUSTOM

## 2020-12-29 NOTE — HISTORY OF PRESENT ILLNESS
[FreeTextEntry1] : Patient is a 85 year old female seen with friend\par \par Diagnosis: Stage IB [id number] ER/CT + Her2- G2 invasive ductal carcinoma of the left breast s/p lumpectomy now here to discuss adjuvant radiation options. 3.4cm breast lesion with 1cm metastatic lymph node with extracapsular extension.\par \par 12/28/20- 10/16 fx feels treated site is a little swollen, skin pink-carla, no skin breaskdown. Mild fatigue. Doing better with breath hold. Using aquaphor to treated area\par \par 12/22/20- 6/16 fx fatigued. Using aquaphor to treated area. Finding breath hold difficult but managing.\par \par 12/15/20-1/16 fx\par Feeling well. Tolerated first tx. Discussed skin care and expectations for treatment.

## 2020-12-29 NOTE — PHYSICAL EXAM
[General Appearance - Alert] : alert [General Appearance - In No Acute Distress] : in no acute distress [Skin Color & Pigmentation] : normal skin color and pigmentation [de-identified] : left breast pink-carla

## 2020-12-29 NOTE — DISEASE MANAGEMENT
[Pathological] : TNM Stage: p [IB] : IB [TTNM] : 2 [NTNM] : 1 [MTNM] : x [de-identified] : Left breast/nodes

## 2020-12-30 PROCEDURE — 77387C: CUSTOM

## 2020-12-31 PROCEDURE — 77387C: CUSTOM

## 2021-01-04 PROCEDURE — 77387C: CUSTOM

## 2021-01-04 PROCEDURE — 77427 RADIATION TX MANAGEMENT X5: CPT

## 2021-01-05 ENCOUNTER — NON-APPOINTMENT (OUTPATIENT)
Age: 86
End: 2021-01-05

## 2021-01-05 PROCEDURE — 77387C: CUSTOM

## 2021-01-05 NOTE — PHYSICAL EXAM
[General Appearance - Alert] : alert [General Appearance - In No Acute Distress] : in no acute distress [de-identified] : mild erythema and rash left breast/chest

## 2021-01-05 NOTE — HISTORY OF PRESENT ILLNESS
[FreeTextEntry1] : Patient is a 85 year old female seen with friend\par \par Diagnosis: Stage IB [id number] ER/LA + Her2- G2 invasive ductal carcinoma of the left breast s/p lumpectomy now here to discuss adjuvant radiation options. 3.4cm breast lesion with 1cm metastatic lymph node with extracapsular extension.\par \par 1/5//21- 14/16 fx mild erythema and fatigue. Using aquaphor to treated area\par \par 12/28/20- 10/16 fx feels treated site is a little swollen, skin pink-carla, no skin breakdown. Mild fatigue. Doing better with breath hold. Using aquaphor to treated area\par \par 12/22/20- 6/16 fx fatigued. Using aquaphor to treated area. Finding breath hold difficult but managing.\par \par 12/15/20-1/16 fx\par Feeling well. Tolerated first tx. Discussed skin care and expectations for treatment.

## 2021-01-05 NOTE — REVIEW OF SYSTEMS
[Fatigue: Grade 1 - Fatigue relieved by rest] : Fatigue: Grade 1 - Fatigue relieved by rest [Dermatitis Radiation: Grade 1 - Faint erythema or dry desquamation] : Dermatitis Radiation: Grade 1 - Faint erythema or dry desquamation

## 2021-01-05 NOTE — DISEASE MANAGEMENT
[Pathological] : TNM Stage: p [IB] : IB [TTNM] : 2 [NTNM] : 1 [MTNM] : x [de-identified] : Left breast/nodes

## 2021-01-06 PROCEDURE — 77387C: CUSTOM

## 2021-01-07 PROCEDURE — 77387C: CUSTOM

## 2021-01-21 ENCOUNTER — OUTPATIENT (OUTPATIENT)
Dept: OUTPATIENT SERVICES | Facility: HOSPITAL | Age: 86
LOS: 1 days | Discharge: HOME | End: 2021-01-21

## 2021-01-21 DIAGNOSIS — C50.919 MALIGNANT NEOPLASM OF UNSPECIFIED SITE OF UNSPECIFIED FEMALE BREAST: ICD-10-CM

## 2021-01-21 DIAGNOSIS — H26.9 UNSPECIFIED CATARACT: Chronic | ICD-10-CM

## 2021-01-26 ENCOUNTER — APPOINTMENT (OUTPATIENT)
Dept: HEMATOLOGY ONCOLOGY | Facility: CLINIC | Age: 86
End: 2021-01-26
Payer: MEDICARE

## 2021-01-26 VITALS
DIASTOLIC BLOOD PRESSURE: 85 MMHG | SYSTOLIC BLOOD PRESSURE: 148 MMHG | BODY MASS INDEX: 30.38 KG/M2 | RESPIRATION RATE: 16 BRPM | OXYGEN SATURATION: 96 % | HEART RATE: 75 BPM | WEIGHT: 169.31 LBS | TEMPERATURE: 97.7 F

## 2021-01-26 PROCEDURE — 99214 OFFICE O/P EST MOD 30 MIN: CPT

## 2021-01-27 RX ORDER — PROPRANOLOL HYDROCHLORIDE 40 MG/1
40 TABLET ORAL
Qty: 30 | Refills: 0 | Status: DISCONTINUED | COMMUNITY
Start: 2020-02-03

## 2021-01-27 RX ORDER — PROPRANOLOL HYDROCHLORIDE 20 MG/1
20 TABLET ORAL
Qty: 90 | Refills: 0 | Status: DISCONTINUED | COMMUNITY
Start: 2020-09-24

## 2021-01-27 RX ORDER — PROPRANOLOL HYDROCHLORIDE 60 MG/1
60 CAPSULE, EXTENDED RELEASE ORAL
Qty: 90 | Refills: 0 | Status: DISCONTINUED | COMMUNITY
Start: 2020-11-27

## 2021-01-27 NOTE — REVIEW OF SYSTEMS
[Fever] : no fever [Chills] : no chills [Night Sweats] : no night sweats [Fatigue] : fatigue [Recent Change In Weight] : ~T no recent weight change [Confused] : no confusion [Dizziness] : no dizziness [Fainting] : no fainting [Difficulty Walking] : no difficulty walking [Negative] : Allergic/Immunologic [de-identified] : tremors

## 2021-01-27 NOTE — ASSESSMENT
[FreeTextEntry1] : She is a 86 y/o  F s/p lumpectomy with sentinel lymph node biopsy T2N1 that is ER/ VT positive [id number] negative. She had Oncotype score of 13 and we recommended endocrine therapy according to results from [facility]. We reviewed her fatigue and reviewed increase of stamina with exercise: chair yoga. She will increase anastrozole to 5 times a week: M to Friday. She will let us know if fatigue worsens: if so, will consider switch to letrozole. We will have follow up in 2 months.

## 2021-01-27 NOTE — HISTORY OF PRESENT ILLNESS
[Disease: _____________________] : Disease: [unfilled] [T: ___] : T[unfilled] [N: ___] : N[unfilled] [AJCC Stage: ____] : AJCC Stage: [unfilled] [de-identified] : invasive ductal ductal carcinoma moderately differentiated ER %, VT 41-50%, Nmk0tdp negative  [de-identified] : Age 85: left breast cancer\par She had been followed for ascending aorta aneurysm with CT imaging. CT from 7/16/2020 showed 4 cm ascending aorta, new 1.9 cm left breast mass, and mild multifocal tree in bud type nodularity in the lungs, and 4 mm left lower lobe nodule. She had not been having her annual mammograms for years. Diagnostic mammogram done on 7/27/2020 showed heterogeneously dense breasts with 2.5 cm oval lobulated and spiculated high density mass in the left breast upper outer quadrant middle depth.  The breast biopsy showed invasive moderately differentiated ductal carcinoma ER %, [place] 41-50%, Efs6fuv negative and Ki67 of 30% and DCIS. She underwent left lumpectomy with sentinel lymph node biopsy with Dr Hurtado on 9/24/2020. The pathology showed invasive ductal carcinoma, moderately differentiated measuring 3.4 cm metastatic to 1 out of 4 lymph nodes (invasive component 1 cm). Oncotype had low score of 13 and data from [facility] used: recommended endocrine therapy for postmenopausal patient. She completed RT to the surgical site. Anastrozole was continued.  [de-identified] : anastrozole 11/2020 to present  [de-identified] : She has increased fatigue and attributed this to RT and increase of propranolol dose. 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 She denies any new breast pain or changes. She denies any new bone pain. No falls or weakness.

## 2021-01-27 NOTE — PHYSICAL EXAM
[Restricted in physically strenuous activity but ambulatory and able to carry out work of a light or sedentary nature] : Status 1- Restricted in physically strenuous activity but ambulatory and able to carry out work of a light or sedentary nature, e.g., light house work, office work [Normal] : affect appropriate [de-identified] : resting tremors over the BUE; strength 4/5 BUE and BLE [de-identified] : breast lumpectomy; no abnl masses or palpable axillary LN

## 2021-01-27 NOTE — REASON FOR VISIT
[Follow-Up Visit] : a follow-up [FreeTextEntry2] : follow up for breast cancer on anastrozole s/p RT  Fair

## 2021-02-03 ENCOUNTER — NON-APPOINTMENT (OUTPATIENT)
Age: 86
End: 2021-02-03

## 2021-02-10 ENCOUNTER — APPOINTMENT (OUTPATIENT)
Dept: RADIATION ONCOLOGY | Facility: CLINIC | Age: 86
End: 2021-02-10
Payer: MEDICARE

## 2021-02-10 VITALS
SYSTOLIC BLOOD PRESSURE: 138 MMHG | TEMPERATURE: 97.5 F | HEART RATE: 97 BPM | DIASTOLIC BLOOD PRESSURE: 84 MMHG | WEIGHT: 164.13 LBS | OXYGEN SATURATION: 97 % | BODY MASS INDEX: 29.45 KG/M2 | RESPIRATION RATE: 16 BRPM

## 2021-02-10 PROCEDURE — 99024 POSTOP FOLLOW-UP VISIT: CPT

## 2021-02-10 NOTE — REVIEW OF SYSTEMS
[Constipation: Grade 0] : Constipation: Grade 0 [Diarrhea: Grade 0] : Diarrhea: Grade 0 [Breast Pain: Grade 0] : Breast Pain: Grade 0 [Cough: Grade 0] : Cough: Grade 0 [Dyspnea: Grade 0] : Dyspnea: Grade 0 [Skin Hyperpigmentation: Grade 0] : Skin Hyperpigmentation: Grade 0 [Skin Induration: Grade 0] : Skin Induration: Grade 0 [Dermatitis Radiation: Grade 0] : Dermatitis Radiation: Grade 0

## 2021-02-10 NOTE — HISTORY OF PRESENT ILLNESS
[FreeTextEntry1] : Florence Hennessy is a 85 year old woman  with malignant neoplasm of upper outer quad of left breast.\par \par Diagnosis: stage II [id number] ER/[place] + Her2- G2 invasive ductal carcinoma of the left breast s/p lumpectomy now here to discuss adjuvant radiation options. 3.4cm breast lesion with 1cm metastatic lymph node with extracapsular extension.\par \par Radiation treatment [id number] to left breast and nodes completed 1/7/21\par \par Recovered well, skin fully recovered and energy levels returned.\par

## 2021-03-24 ENCOUNTER — APPOINTMENT (OUTPATIENT)
Dept: SURGICAL ONCOLOGY | Facility: CLINIC | Age: 86
End: 2021-03-24
Payer: MEDICARE

## 2021-03-24 VITALS
HEART RATE: 88 BPM | WEIGHT: 164 LBS | SYSTOLIC BLOOD PRESSURE: 141 MMHG | HEIGHT: 63 IN | DIASTOLIC BLOOD PRESSURE: 90 MMHG | BODY MASS INDEX: 29.06 KG/M2 | OXYGEN SATURATION: 97 %

## 2021-03-24 PROCEDURE — 99214 OFFICE O/P EST MOD 30 MIN: CPT

## 2021-03-24 NOTE — PHYSICAL EXAM
[FreeTextEntry1] : COVID -19 precautions as per [facility] policy was universally followed. \par KN present during exam \par  [de-identified] : Normal S1, S2. Regular rate and rhythm; [de-identified] : Incisions healing well. No evidence of infection. No hematoma. Mild postop fluid collection.  [de-identified] : Clear breath sounds bilaterally, normal resp. rate

## 2021-03-24 NOTE — CONSULT LETTER
[Dear  ___] : Dear  [unfilled], [Consult Letter:] : I had the pleasure of evaluating your patient, [unfilled]. [Please see my note below.] : Please see my note below. [Consult Closing:] : Thank you very much for allowing me to participate in the care of this patient.  If you have any questions, please do not hesitate to contact me. [Sincerely,] : Sincerely, [FreeTextEntry2] : Jose Juan Fu MD [FreeTextEntry3] : Eduardo Hurtado MD \par (O) [phone]\par (F) [phone]

## 2021-03-24 NOTE — HISTORY OF PRESENT ILLNESS
[de-identified] : Ms. Hennessy is a 85 year old female who presents today for a followup visit secondary to left breast lumpectomy with sentinel node excision completed on 9/24/20. \par \par Pt is s/p left breast lumpectomy with sentinel node excision. \par Final Diagnosis\par 1. Lymph nodes, left axillary sentinel, sentinel node biopsies\par - Metastatic carcinoma in 1 of 4 nodes (1/4) (1.0 cm with\par extracapsular extension)\par 2. Breast, left, lumpectomy\par - Invasive duct carcinoma, moderately differentiated (3.4\par cm) with adjacent microscopic satellite foci\par - Ductal carcinoma in situ focal, intermediate nuclear\par grade, solid with necrosis\par 3. Breast, left additional superior margin, margin biopsy\par - Unremarkable adipose tissue\par 4. Breast, additional medial margin, mergin biopsy\par - Unremarkable adipose tissue\par 5. Breast, additional inferior margin, margin biopsy\par - Focal non-proliferative fibrocystic change\par 6. Breast, additional lateral margin, margin biopsy\par - Unremarkable adipose tissue\par 7. Breast, additional deep margin, margin biopsy\par - Unremarkable adipose tissue and skeletal muscle\par \par Pertinent Hx:\par She  was referred by Dr. Jose Juan Fu.Patient had recent CT scan to follow an ascending AA . A soft tissue mass was noted in the left breast. A mammogram was performed on July 27 which identified a 2.5 cm suspicious mass. In retrospect this was not felt by the patient.\par \par She underwent a biopsy of the left breast 2:00 revealing invasive ductal carcinoma, moderately differentiated (Er+Pr+Her2-).\par \par Today the pt was w/o any complaints. Denies palpable breast masses, nipple discharge, skin changes, inversion of breast pain. Denies constitutional symptoms. States to have completed \par

## 2021-03-24 NOTE — ADDENDUM
[FreeTextEntry1] : I, Ludy Rose, acted solely as a scribe for Dr. Eduardo Hurtado on this date 3/24/2021

## 2021-04-20 ENCOUNTER — OUTPATIENT (OUTPATIENT)
Dept: OUTPATIENT SERVICES | Facility: HOSPITAL | Age: 86
LOS: 1 days | Discharge: HOME | End: 2021-04-20

## 2021-04-20 DIAGNOSIS — H26.9 UNSPECIFIED CATARACT: Chronic | ICD-10-CM

## 2021-04-20 DIAGNOSIS — C50.919 MALIGNANT NEOPLASM OF UNSPECIFIED SITE OF UNSPECIFIED FEMALE BREAST: ICD-10-CM

## 2021-04-21 ENCOUNTER — APPOINTMENT (OUTPATIENT)
Dept: HEMATOLOGY ONCOLOGY | Facility: CLINIC | Age: 86
End: 2021-04-21
Payer: MEDICARE

## 2021-04-21 PROCEDURE — 99442: CPT | Mod: 95

## 2021-08-30 ENCOUNTER — RESULT REVIEW (OUTPATIENT)
Age: 86
End: 2021-08-30

## 2021-08-30 ENCOUNTER — APPOINTMENT (OUTPATIENT)
Dept: MAMMOGRAPHY | Facility: CLINIC | Age: 86
End: 2021-08-30
Payer: MEDICARE

## 2021-08-30 ENCOUNTER — OUTPATIENT (OUTPATIENT)
Dept: OUTPATIENT SERVICES | Facility: HOSPITAL | Age: 86
LOS: 1 days | End: 2021-08-30
Payer: MEDICARE

## 2021-08-30 ENCOUNTER — APPOINTMENT (OUTPATIENT)
Dept: ULTRASOUND IMAGING | Facility: CLINIC | Age: 86
End: 2021-08-30
Payer: MEDICARE

## 2021-08-30 DIAGNOSIS — Z08 ENCOUNTER FOR FOLLOW-UP EXAMINATION AFTER COMPLETED TREATMENT FOR MALIGNANT NEOPLASM: ICD-10-CM

## 2021-08-30 DIAGNOSIS — H26.9 UNSPECIFIED CATARACT: Chronic | ICD-10-CM

## 2021-08-30 PROCEDURE — G0279: CPT | Mod: 26

## 2021-08-30 PROCEDURE — 77066 DX MAMMO INCL CAD BI: CPT | Mod: 26

## 2021-08-30 PROCEDURE — 76641 ULTRASOUND BREAST COMPLETE: CPT | Mod: 26,50

## 2021-08-30 PROCEDURE — G0279: CPT

## 2021-08-30 PROCEDURE — 77066 DX MAMMO INCL CAD BI: CPT

## 2021-08-30 PROCEDURE — 76641 ULTRASOUND BREAST COMPLETE: CPT

## 2021-09-08 ENCOUNTER — APPOINTMENT (OUTPATIENT)
Dept: SURGICAL ONCOLOGY | Facility: CLINIC | Age: 86
End: 2021-09-08
Payer: MEDICARE

## 2021-09-08 VITALS
DIASTOLIC BLOOD PRESSURE: 94 MMHG | HEART RATE: 83 BPM | BODY MASS INDEX: 28.7 KG/M2 | SYSTOLIC BLOOD PRESSURE: 171 MMHG | OXYGEN SATURATION: 96 % | WEIGHT: 162 LBS | RESPIRATION RATE: 16 BRPM | HEIGHT: 63 IN

## 2021-09-08 DIAGNOSIS — M25.60 STIFFNESS OF UNSPECIFIED JOINT, NOT ELSEWHERE CLASSIFIED: ICD-10-CM

## 2021-09-08 PROCEDURE — 99214 OFFICE O/P EST MOD 30 MIN: CPT

## 2021-09-08 NOTE — HISTORY OF PRESENT ILLNESS
[de-identified] : Ms. Hennessy is an 86 year old female who presents today for a followup visit secondary to left breast lumpectomy with sentinel node excision completed on 9/24/20. \par \par Pt is s/p left breast lumpectomy with sentinel node excision 9/24/2020: \par Final Diagnosis\par 1. Lymph nodes, left axillary sentinel, sentinel node biopsies\par - Metastatic carcinoma in 1 of 4 nodes (1/4) (1.0 cm with extracapsular extension)\par 2. Breast, left, lumpectomy\par - Invasive duct carcinoma, moderately differentiated (3.4 cm) with adjacent microscopic satellite foci\par - Ductal carcinoma in situ focal, intermediate nuclear grade, solid with necrosis\par 3. Breast, left additional superior margin, margin biopsy\par - Unremarkable adipose tissue\par 4. Breast, additional medial margin, mergin biopsy\par - Unremarkable adipose tissue\par 5. Breast, additional inferior margin, margin biopsy\par - Focal non-proliferative fibrocystic change\par 6. Breast, additional lateral margin, margin biopsy\par - Unremarkable adipose tissue\par 7. Breast, additional deep margin, margin biopsy\par - Unremarkable adipose tissue and skeletal muscle\par \par Oncotype Dx=13; Patient completed adjuvant RT under the care of Dr. Saucedo. On  Anastrozole 1 mg daily under the care of Dr. Walker Smyth.  \par \par B/L mammo/sono 8/30/2021- New post lumpectomy and posttreatment changes left breast. No mammographic or sonographic evidence of malignancy. BIRADS 2 \par \par Denies palpable breast masses, nipple discharge, skin dimpling, inversion or breast pain. States she has decreased ROM to the LUQ and experiences left flank/left shoulder pain with certain movements. States she had left flank shingles a while back which was not treated properly.  Does not get relief with Gabapentin. Pt. is willing to try physical therapy. \par \par \par Pertinent Hx:\par She  was referred by Dr. Jose Juan Fu.Patient had recent CT scan to follow an ascending AA . A soft tissue mass was noted in the left breast. A mammogram was performed on July 27 which identified a 2.5 cm suspicious mass. In retrospect this was not felt by the patient.\par \par She underwent a biopsy of the left breast 2:00 revealing invasive ductal carcinoma, moderately differentiated (Er+Pr+Her2-).\par \par Today the pt was w/o any complaints. Denies palpable breast masses, nipple discharge, skin changes, inversion of breast pain. Denies constitutional symptoms. States to have completed \par

## 2021-09-08 NOTE — ASSESSMENT
[FreeTextEntry1] : Impression:\par S/p left lumpectomy and SLNB 9/24/2020 for left breast IDC (Er+Pr+Her2-) \par Tumor stage: pT2 pN1; AJCC Stage: I\par Oncotype DX: 13\par S/p RT; On Anastrozole\par B/L Mammo/sono 8/2021- BIRADS 2 \par Decreased ROM LUE\par \par \par Plan:\par Continue f/u with medical oncology. \par B/L mammo/sono 8/2022 \par RTO yearly  \par PT referral \par

## 2021-09-08 NOTE — PHYSICAL EXAM
[Normal] : supple, no neck mass and thyroid not enlarged [Normal Supraclavicular Lymph Nodes] : normal supraclavicular lymph nodes [Normal Axillary Lymph Nodes] : normal axillary lymph nodes [Normal] : oriented to person, place and time, with appropriate affect [FreeTextEntry1] : COVID -19 precautions as per [facility] policy was universally followed. \par VEE present during exam \par  [de-identified] : Normal S1, S2. Regular rate and rhythm; [de-identified] : well healed left breast incision; post radiation changes to the left breast;  No masses or nipple discharge bilaterally  [de-identified] : Clear breath sounds bilaterally, normal resp. rate  [de-identified] : decreased ROM LUE

## 2021-09-16 ENCOUNTER — OUTPATIENT (OUTPATIENT)
Dept: OUTPATIENT SERVICES | Facility: HOSPITAL | Age: 86
LOS: 1 days | Discharge: HOME | End: 2021-09-16

## 2021-09-16 DIAGNOSIS — C50.919 MALIGNANT NEOPLASM OF UNSPECIFIED SITE OF UNSPECIFIED FEMALE BREAST: ICD-10-CM

## 2021-09-16 DIAGNOSIS — H26.9 UNSPECIFIED CATARACT: Chronic | ICD-10-CM

## 2021-09-17 ENCOUNTER — APPOINTMENT (OUTPATIENT)
Dept: HEMATOLOGY ONCOLOGY | Facility: CLINIC | Age: 86
End: 2021-09-17
Payer: MEDICARE

## 2021-09-17 ENCOUNTER — RESULT REVIEW (OUTPATIENT)
Age: 86
End: 2021-09-17

## 2021-09-17 VITALS
WEIGHT: 163.14 LBS | HEART RATE: 100 BPM | SYSTOLIC BLOOD PRESSURE: 122 MMHG | OXYGEN SATURATION: 98 % | DIASTOLIC BLOOD PRESSURE: 81 MMHG | RESPIRATION RATE: 18 BRPM | TEMPERATURE: 97.9 F | BODY MASS INDEX: 28.91 KG/M2 | HEIGHT: 63.07 IN

## 2021-09-17 LAB
BASOPHILS # BLD AUTO: 0.02 K/UL — SIGNIFICANT CHANGE UP (ref 0–0.2)
BASOPHILS NFR BLD AUTO: 0.4 % — SIGNIFICANT CHANGE UP (ref 0–2)
EOSINOPHIL # BLD AUTO: 0.1 K/UL — SIGNIFICANT CHANGE UP (ref 0–0.5)
EOSINOPHIL NFR BLD AUTO: 2.1 % — SIGNIFICANT CHANGE UP (ref 0–6)
HCT VFR BLD CALC: 39.5 % — SIGNIFICANT CHANGE UP (ref 34.5–45)
HGB BLD-MCNC: 12.4 G/DL — SIGNIFICANT CHANGE UP (ref 11.5–15.5)
IMM GRANULOCYTES NFR BLD AUTO: 0.6 % — SIGNIFICANT CHANGE UP (ref 0–1.5)
LYMPHOCYTES # BLD AUTO: 1 K/UL — SIGNIFICANT CHANGE UP (ref 1–3.3)
LYMPHOCYTES # BLD AUTO: 21.1 % — SIGNIFICANT CHANGE UP (ref 13–44)
MCHC RBC-ENTMCNC: 31.4 G/DL — LOW (ref 32–36)
MCHC RBC-ENTMCNC: 31.6 PG — SIGNIFICANT CHANGE UP (ref 27–34)
MCV RBC AUTO: 100.5 FL — HIGH (ref 80–100)
MONOCYTES # BLD AUTO: 0.49 K/UL — SIGNIFICANT CHANGE UP (ref 0–0.9)
MONOCYTES NFR BLD AUTO: 10.3 % — SIGNIFICANT CHANGE UP (ref 2–14)
NEUTROPHILS # BLD AUTO: 3.1 K/UL — SIGNIFICANT CHANGE UP (ref 1.8–7.4)
NEUTROPHILS NFR BLD AUTO: 65.5 % — SIGNIFICANT CHANGE UP (ref 43–77)
NRBC # BLD: 0 /100 WBCS — SIGNIFICANT CHANGE UP (ref 0–0)
PLATELET # BLD AUTO: 234 K/UL — SIGNIFICANT CHANGE UP (ref 150–400)
RBC # BLD: 3.93 M/UL — SIGNIFICANT CHANGE UP (ref 3.8–5.2)
RBC # FLD: 13.5 % — SIGNIFICANT CHANGE UP (ref 10.3–14.5)
WBC # BLD: 4.74 K/UL — SIGNIFICANT CHANGE UP (ref 3.8–10.5)
WBC # FLD AUTO: 4.74 K/UL — SIGNIFICANT CHANGE UP (ref 3.8–10.5)

## 2021-09-17 PROCEDURE — 99214 OFFICE O/P EST MOD 30 MIN: CPT

## 2021-09-21 LAB
ALBUMIN SERPL ELPH-MCNC: 4 G/DL
ALP BLD-CCNC: 129 U/L
ALT SERPL-CCNC: 16 U/L
ANION GAP SERPL CALC-SCNC: 10 MMOL/L
AST SERPL-CCNC: 19 U/L
BILIRUB SERPL-MCNC: 0.3 MG/DL
BUN SERPL-MCNC: 17 MG/DL
CALCIUM SERPL-MCNC: 9.7 MG/DL
CHLORIDE SERPL-SCNC: 103 MMOL/L
CO2 SERPL-SCNC: 26 MMOL/L
CREAT SERPL-MCNC: 0.74 MG/DL
FOLATE SERPL-MCNC: 13.2 NG/ML
GLUCOSE SERPL-MCNC: 107 MG/DL
IRON SERPL-MCNC: 72 UG/DL
POTASSIUM SERPL-SCNC: 4.5 MMOL/L
PROT SERPL-MCNC: 7.1 G/DL
SODIUM SERPL-SCNC: 139 MMOL/L
T3FREE SERPL-MCNC: 2.2 PG/ML
T4 FREE SERPL-MCNC: 1.5 NG/DL
TSH SERPL-ACNC: 2.73 UIU/ML
VIT B12 SERPL-MCNC: 151 PG/ML

## 2021-09-21 NOTE — CONSULT LETTER
[Dear  ___] : Dear  [unfilled], [Courtesy Letter:] : I had the pleasure of seeing your patient, [unfilled], in my office today. [Please see my note below.] : Please see my note below. [Consult Closing:] : Thank you very much for allowing me to participate in the care of this patient.  If you have any questions, please do not hesitate to contact me. [Sincerely,] : Sincerely, [FreeTextEntry2] : Jose Juan Fu MD\par [address]\[place] [address] [FreeTextEntry1] : We s/w pt regarding blood work done to evaluate fatigue: she is severely B12 deficient. She had this in the past but was told it had resolved. We recommended weekly B12 injections x 4 and then monthly; she would prefer to have done with PCP since she lives [place]by. Will forward labs to PCP.  [FreeTextEntry3] : Walker Smyth MD\par Attending\par [facility]\par

## 2021-09-21 NOTE — HISTORY OF PRESENT ILLNESS
[Disease: _____________________] : Disease: [unfilled] [T: ___] : T[unfilled] [N: ___] : N[unfilled] [AJCC Stage: ____] : AJCC Stage: [unfilled] [de-identified] : Age 85: left breast cancer\par She had been followed for ascending aorta aneurysm with CT imaging. CT from 7/16/2020 showed 4 cm ascending aorta, new 1.9 cm left breast mass, and mild multifocal tree in bud type nodularity in the lungs, and 4 mm left lower lobe nodule. She had not been having her annual mammograms for years. Diagnostic mammogram done on 7/27/2020 showed heterogeneously dense breasts with 2.5 cm oval lobulated and spiculated high density mass in the left breast upper outer quadrant middle depth.  The breast biopsy showed invasive moderately differentiated ductal carcinoma ER %, [place] 41-50%, Pql4xmh negative and Ki67 of 30% and DCIS. She underwent left lumpectomy with sentinel lymph node biopsy with Dr Hurtado on 9/24/2020. The pathology showed invasive ductal carcinoma, moderately differentiated measuring 3.4 cm metastatic to 1 out of 4 lymph nodes (invasive component 1 cm). Oncotype had low score of 13 and data from [employer] used: recommended endocrine therapy for postmenopausal patient. She completed RT to the surgical site. Anastrozole was continued.  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 She will wake up at times and notice within short period of time she has fatigue where she has to lie down: after lying down for few minutes, she feels better but sometimes fatigue lasts and she needs a nap. She feels her active lifestyle is being affected and wondering if the anastrozole is causing fatigue. 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 She denies any new chest wall or breast pain. No new medications. Has been taking propranolol and understands may cause fatigue.

## 2021-09-21 NOTE — PHYSICAL EXAM
[Restricted in physically strenuous activity but ambulatory and able to carry out work of a light or sedentary nature] : Status 1- Restricted in physically strenuous activity but ambulatory and able to carry out work of a light or sedentary nature, e.g., light house work, office work [Normal] : affect appropriate [de-identified] : breast lumpectomy; no abnl masses or palpable axillary LN  [de-identified] : resting tremors over the BUE; strength 4/5 BUE and BLE

## 2021-09-21 NOTE — ASSESSMENT
[FreeTextEntry1] : She is a 85 y/o  F s/p lumpectomy with sentinel lymph node biopsy T2N1 that is ER/ [place] positive [id number] negative. She has been on anastrozole since 11/2020. We reviewed reasons for fatigue: from endocrine therapy and also side effect of other medications. Will have her hold anastrozole and see if the fatigue improves. WIll obtain CBC, B12, TSH, CMP to further evaluate fatigue. We reviewed if fatigue improves, we would change anastrozole to letrozole to see if fatigue is better; if fatigue is not improved off anastrozole, we would resume the medication: pt understands and agreeable with plan. Next follow up in 4 months but earlier if any new symptoms.\par

## 2021-09-22 ENCOUNTER — APPOINTMENT (OUTPATIENT)
Dept: RADIATION ONCOLOGY | Facility: CLINIC | Age: 86
End: 2021-09-22
Payer: MEDICARE

## 2021-09-22 VITALS
DIASTOLIC BLOOD PRESSURE: 82 MMHG | HEART RATE: 76 BPM | BODY MASS INDEX: 28.46 KG/M2 | SYSTOLIC BLOOD PRESSURE: 148 MMHG | WEIGHT: 161 LBS | TEMPERATURE: 97.7 F | OXYGEN SATURATION: 99 % | RESPIRATION RATE: 18 BRPM

## 2021-09-22 PROCEDURE — 99212 OFFICE O/P EST SF 10 MIN: CPT

## 2021-09-22 RX ORDER — PROPRANOLOL HYDROCHLORIDE 40 MG/1
40 TABLET ORAL
Refills: 0 | Status: ACTIVE | COMMUNITY
Start: 2020-02-03

## 2021-09-22 NOTE — PHYSICAL EXAM
[Normal] : normal heart rate and rhythm, normal S1 and S2, and no murmurs present [No UE Edema] : there is no upper extremity edema [Cervical Lymph Nodes Enlarged Posterior Bilaterally] : posterior cervical [Cervical Lymph Nodes Enlarged Anterior Bilaterally] : anterior cervical [Supraclavicular Lymph Nodes Enlarged Bilaterally] : supraclavicular [Axillary Lymph Nodes Enlarged Bilaterally] : axillary [de-identified] : swelling and hardness noted to left lateral breast/axilla  [de-identified] : mild swelling to left upper arm  [de-identified] : no residual radiation changes

## 2021-09-22 NOTE — REVIEW OF SYSTEMS
[Fatigue] : fatigue [Negative] : Psychiatric [Fatigue: Grade 1 - Fatigue relieved by rest] : Fatigue: Grade 1 - Fatigue relieved by rest [Breast Pain: Grade 1 - Mild pain] : Breast Pain: Grade 1 - Mild pain [Dermatitis Radiation: Grade 0] : Dermatitis Radiation: Grade 0 [Fever] : no fever [Chills] : no chills [Night Sweats] : no night sweats [Recent Change In Weight] : ~T no recent weight change [FreeTextEntry2] : intermittent periods of fatigue

## 2021-11-30 ENCOUNTER — OUTPATIENT (OUTPATIENT)
Dept: OUTPATIENT SERVICES | Facility: HOSPITAL | Age: 86
LOS: 1 days | Discharge: HOME | End: 2021-11-30

## 2021-11-30 DIAGNOSIS — C50.919 MALIGNANT NEOPLASM OF UNSPECIFIED SITE OF UNSPECIFIED FEMALE BREAST: ICD-10-CM

## 2021-11-30 DIAGNOSIS — H26.9 UNSPECIFIED CATARACT: Chronic | ICD-10-CM

## 2021-12-01 ENCOUNTER — APPOINTMENT (OUTPATIENT)
Dept: HEMATOLOGY ONCOLOGY | Facility: CLINIC | Age: 86
End: 2021-12-01
Payer: MEDICARE

## 2021-12-01 VITALS
RESPIRATION RATE: 16 BRPM | HEART RATE: 76 BPM | SYSTOLIC BLOOD PRESSURE: 149 MMHG | DIASTOLIC BLOOD PRESSURE: 96 MMHG | WEIGHT: 168.21 LBS | BODY MASS INDEX: 29.8 KG/M2 | OXYGEN SATURATION: 98 % | HEIGHT: 63.07 IN | TEMPERATURE: 97.3 F

## 2021-12-01 DIAGNOSIS — R53.83 OTHER FATIGUE: ICD-10-CM

## 2021-12-01 PROCEDURE — 99214 OFFICE O/P EST MOD 30 MIN: CPT

## 2021-12-02 PROBLEM — R53.83 FATIGUE: Status: ACTIVE | Noted: 2021-09-17

## 2021-12-02 NOTE — REASON FOR VISIT
[Follow-Up Visit] : a follow-up [Friend] : friend [FreeTextEntry2] : follow up for breast cancer on anastrozole

## 2021-12-02 NOTE — ASSESSMENT
[FreeTextEntry1] : She is a 85 y/o  F s/p lumpectomy with sentinel lymph node biopsy T2N1 that is ER/ [place] positive [id number] negative. She has been on anastrozole since 11/2020. We reviewed her fatigue which is still improved compared to last visit after B12 injections. We reviewed anastrozole and chair exercises to maintain muscle mass to help to maintain healthy weight. We reviewed her blood work done with PCP: CBC WNL and B12 in normal levels. We reviewed signs and symptoms of breast cancer recurrence. She will continue with anastrozole. Next follow up in 6 months but earlier if any new symptoms.\par

## 2021-12-02 NOTE — REVIEW OF SYSTEMS
[Negative] : Allergic/Immunologic [Confused] : no confusion [Dizziness] : no dizziness [Fainting] : no fainting [Difficulty Walking] : no difficulty walking [de-identified] : baseline tremors over the hands and sensitivity over the LUE

## 2021-12-02 NOTE — HISTORY OF PRESENT ILLNESS
[Disease: _____________________] : Disease: [unfilled] [T: ___] : T[unfilled] [N: ___] : N[unfilled] [AJCC Stage: ____] : AJCC Stage: [unfilled] [de-identified] : Age 85: left breast cancer\par She had been followed for ascending aorta aneurysm with CT imaging. CT from 7/16/2020 showed 4 cm ascending aorta, new 1.9 cm left breast mass, and mild multifocal tree in bud type nodularity in the lungs, and 4 mm left lower lobe nodule. She had not been having her annual mammograms for years. Diagnostic mammogram done on 7/27/2020 showed heterogeneously dense breasts with 2.5 cm oval lobulated and spiculated high density mass in the left breast upper outer quadrant middle depth.  The breast biopsy showed invasive moderately differentiated ductal carcinoma ER %, [place] 41-50%, Tqq8gei negative and Ki67 of 30% and DCIS. She underwent left lumpectomy with sentinel lymph node biopsy with Dr Hurtado on 9/24/2020. The pathology showed invasive ductal carcinoma, moderately differentiated measuring 3.4 cm metastatic to 1 out of 4 lymph nodes (invasive component 1 cm). Oncotype had low score of 13 and data from [employer] used: recommended endocrine therapy for postmenopausal patient. She completed RT to the surgical site. Anastrozole was continued.  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 Has completed B12 injections with her PCP and will be starting on B12 oral supplement. Still has fatigue even though has completed with injection B12: able to do her ADLs and activities she likes but not as active as she used to be. 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 She denies any new back pain or chest wall changes. Continues to have sensitivity over the LUE since shingles. She will be taking B12 as oral supplement. Had blood work done with PCP this week and brought copy. Feels it is easy to gain weight now on anastrozole.

## 2021-12-02 NOTE — PHYSICAL EXAM
[Restricted in physically strenuous activity but ambulatory and able to carry out work of a light or sedentary nature] : Status 1- Restricted in physically strenuous activity but ambulatory and able to carry out work of a light or sedentary nature, e.g., light house work, office work [Normal] : affect appropriate [de-identified] : breast lumpectomy; no abnl masses or palpable axillary LN  [de-identified] : resting tremors over the BUE; strength 4/5 BUE and BLE

## 2022-03-16 ENCOUNTER — APPOINTMENT (OUTPATIENT)
Dept: SURGICAL ONCOLOGY | Facility: CLINIC | Age: 87
End: 2022-03-16

## 2022-05-04 ENCOUNTER — APPOINTMENT (OUTPATIENT)
Dept: RADIATION ONCOLOGY | Facility: CLINIC | Age: 87
End: 2022-05-04

## 2022-06-15 ENCOUNTER — OUTPATIENT (OUTPATIENT)
Dept: OUTPATIENT SERVICES | Facility: HOSPITAL | Age: 87
LOS: 1 days | Discharge: HOME | End: 2022-06-15

## 2022-06-15 DIAGNOSIS — C50.919 MALIGNANT NEOPLASM OF UNSPECIFIED SITE OF UNSPECIFIED FEMALE BREAST: ICD-10-CM

## 2022-06-15 DIAGNOSIS — H26.9 UNSPECIFIED CATARACT: Chronic | ICD-10-CM

## 2022-06-20 ENCOUNTER — RX RENEWAL (OUTPATIENT)
Age: 87
End: 2022-06-20

## 2022-06-24 ENCOUNTER — APPOINTMENT (OUTPATIENT)
Dept: HEMATOLOGY ONCOLOGY | Facility: CLINIC | Age: 87
End: 2022-06-24
Payer: MEDICARE

## 2022-06-24 VITALS
HEART RATE: 96 BPM | SYSTOLIC BLOOD PRESSURE: 125 MMHG | TEMPERATURE: 97.2 F | RESPIRATION RATE: 16 BRPM | WEIGHT: 165.32 LBS | OXYGEN SATURATION: 98 % | DIASTOLIC BLOOD PRESSURE: 81 MMHG | HEIGHT: 63.07 IN | BODY MASS INDEX: 29.29 KG/M2

## 2022-06-24 DIAGNOSIS — M12.9 ARTHROPATHY, UNSPECIFIED: ICD-10-CM

## 2022-06-24 PROCEDURE — 99213 OFFICE O/P EST LOW 20 MIN: CPT

## 2022-06-24 RX ORDER — DICLOFENAC SODIUM 1% 10 MG/G
1 GEL TOPICAL
Qty: 1 | Refills: 1 | Status: ACTIVE | COMMUNITY
Start: 2022-06-24 | End: 1900-01-01

## 2022-06-26 NOTE — REVIEW OF SYSTEMS
[Fever] : no fever [Chills] : no chills [Night Sweats] : no night sweats [Fatigue] : fatigue [Recent Change In Weight] : ~T no recent weight change [Joint Pain] : joint pain [Joint Stiffness] : joint stiffness [Muscle Pain] : no muscle pain [Muscle Weakness] : no muscle weakness [Negative] : Allergic/Immunologic

## 2022-06-26 NOTE — PHYSICAL EXAM
[Restricted in physically strenuous activity but ambulatory and able to carry out work of a light or sedentary nature] : Status 1- Restricted in physically strenuous activity but ambulatory and able to carry out work of a light or sedentary nature, e.g., light house work, office work [Normal] : affect appropriate [de-identified] : breast lumpectomy; no abnl masses or palpable axillary LN  [de-identified] : resting tremors over the BUE; strength 4/5 BUE and BLE

## 2022-06-26 NOTE — HISTORY OF PRESENT ILLNESS
[Disease: _____________________] : Disease: [unfilled] [T: ___] : T[unfilled] [N: ___] : N[unfilled] [AJCC Stage: ____] : AJCC Stage: [unfilled] [de-identified] : Age 85: left breast cancer\par She had been followed for ascending aorta aneurysm with CT imaging. CT from 7/16/2020 showed 4 cm ascending aorta, new 1.9 cm left breast mass, and mild multifocal tree in bud type nodularity in the lungs, and 4 mm left lower lobe nodule. She had not been having her annual mammograms for years. Diagnostic mammogram done on 7/27/2020 showed heterogeneously dense breasts with 2.5 cm oval lobulated and spiculated high density mass in the left breast upper outer quadrant middle depth.  The breast biopsy showed invasive moderately differentiated ductal carcinoma ER %, [place] 41-50%, Trp0fuv negative and Ki67 of 30% and DCIS. She underwent left lumpectomy with sentinel lymph node biopsy with Dr Hurtado on 9/24/2020. The pathology showed invasive ductal carcinoma, moderately differentiated measuring 3.4 cm metastatic to 1 out of 4 lymph nodes (invasive component 1 cm). Oncotype had low score of 13 and data from [employer] used: recommended endocrine therapy for postmenopausal patient. She completed RT to the surgical site. Anastrozole was continued.  [de-identified] : invasive ductal ductal carcinoma moderately differentiated ER %, DC 41-50%, Aze0buu negative  [de-identified] : anastrozole 11/2020 to present  [de-identified] : Has been having fatigue: has energy spurts where she would do all housework and then would be tired. She tries to stay active. Saw her PCP who did blood work. Denies any new breast pain or chest wall changes. No back pain, cough or HA.\par

## 2022-06-26 NOTE — ASSESSMENT
[FreeTextEntry1] : She is a 86 y/o  F s/p lumpectomy with sentinel lymph node biopsy T2N1 that is ER/ [place] positive Rij2eow negative. She has been on anastrozole since 11/2020. We reviewed endocrine therapy and goals of care. She will remain on therapy. Reviewed blood work done with PCP with CBC and B12 WNL. Questions answered to her satisfaction. Next follow up in 6 months but earlier if any new symptoms.\par

## 2022-07-18 ENCOUNTER — NON-APPOINTMENT (OUTPATIENT)
Age: 87
End: 2022-07-18

## 2022-07-31 ENCOUNTER — NON-APPOINTMENT (OUTPATIENT)
Age: 87
End: 2022-07-31

## 2022-08-09 ENCOUNTER — NON-APPOINTMENT (OUTPATIENT)
Age: 87
End: 2022-08-09

## 2022-08-09 LAB — SARS-COV-2 N GENE NPH QL NAA+PROBE: NOT DETECTED

## 2022-08-10 ENCOUNTER — APPOINTMENT (OUTPATIENT)
Dept: PULMONOLOGY | Facility: CLINIC | Age: 87
End: 2022-08-10

## 2022-08-10 VITALS
HEIGHT: 64 IN | OXYGEN SATURATION: 97 % | WEIGHT: 163 LBS | HEART RATE: 78 BPM | TEMPERATURE: 98.3 F | SYSTOLIC BLOOD PRESSURE: 196 MMHG | DIASTOLIC BLOOD PRESSURE: 98 MMHG | BODY MASS INDEX: 27.83 KG/M2

## 2022-08-10 PROCEDURE — 94729 DIFFUSING CAPACITY: CPT

## 2022-08-10 PROCEDURE — 94726 PLETHYSMOGRAPHY LUNG VOLUMES: CPT

## 2022-08-10 PROCEDURE — ZZZZZ: CPT

## 2022-08-10 PROCEDURE — 94060 EVALUATION OF WHEEZING: CPT

## 2022-08-10 PROCEDURE — 99203 OFFICE O/P NEW LOW 30 MIN: CPT | Mod: 25

## 2022-08-10 NOTE — HISTORY OF PRESENT ILLNESS
[Never] : never [TextBox_4] : 87-year-old woman here for initial office consultation for abnormal CT chest.  Patient is accompanied by her daughter.  Patient was referred by oncology.\par \par This is a cydney and active 87-year-old woman.  She has a history of left breast cancer diagnosed in 2020, T2N1.  Treated with lumpectomy and radiation.  The breast mass was found incidentally on a yearly CT of the chest that she has been getting for years following an aortic aneurysm.  Patient is currently maintained on anastrozole.\par \par She had a follow-up CT of the chest for her aortic aneurysm on July 8, which found a new 1.6 x 1.2 cm masslike opacity in the posterior medial right middle lobe.  Concerning for malignancy.\par \par Patient is completely asymptomatic.  She does not recall having any viral type or other respiratory illness in the last several months.  Her appetite is good.  She has no shortness of breath, she has no dyspnea with exertion, she has no chest pain.\par \par Is a lifelong non-smoker.\par \par she is otherwise in excellent health and stays active\par \par The ascending aortic aneurysm was stable\par \par She does have a history of chronic allergic rhinitis.  She reports that she had been getting allergy shots for 20 years but recently her allergist retired last fall so she no longer gets them.  She complains of a chronic postnasal drip.  She uses Flonase and Zyrtec

## 2022-08-10 NOTE — PHYSICAL EXAM
[No Acute Distress] : no acute distress [Normal Oropharynx] : normal oropharynx [Normal Appearance] : normal appearance [No Neck Mass] : no neck mass [Normal Rate/Rhythm] : normal rate/rhythm [Normal S1, S2] : normal s1, s2 [No Murmurs] : no murmurs [No Resp Distress] : no resp distress [Clear to Auscultation Bilaterally] : clear to auscultation bilaterally [No Abnormalities] : no abnormalities [Benign] : benign [Normal Gait] : normal gait [No Clubbing] : no clubbing [No Cyanosis] : no cyanosis [No Edema] : no edema [FROM] : FROM [Normal Color/ Pigmentation] : normal color/ pigmentation [No Focal Deficits] : no focal deficits [Oriented x3] : oriented x3 [Normal Affect] : normal affect [TextBox_132] : Resting tremor

## 2022-08-10 NOTE — CONSULT LETTER
[Consult Letter:] : I had the pleasure of evaluating your patient, [unfilled]. [FreeTextEntry2] : Dr. Walker Smyth

## 2022-08-10 NOTE — DISCUSSION/SUMMARY
[FreeTextEntry1] : Active 87-year-old woman with a history of breast cancer, on the left, T2N1 treated in 2020 with lumpectomy and radiation.  Now with an incidentally noted right upper lobe mass on CAT scan for following up thoracic aortic aneurysm.  That CAT scan was done on July 8, and is already been more than 1 month.\par \par Options discussed with the patient and her daughter.  At this time I would recommend doing the short-term follow-up scan as a PET scan for complete information.  Based on results, we talked about what potential neck steps would be.  Patient agrees.  She will call to schedule the PET scan.  I will call her with the results, and follow-up based on that.\par \par 's pulmonary function test today was normal.\par \par She is fully vaccinated for COVID

## 2022-08-16 ENCOUNTER — OUTPATIENT (OUTPATIENT)
Dept: OUTPATIENT SERVICES | Facility: HOSPITAL | Age: 87
LOS: 1 days | End: 2022-08-16
Payer: MEDICARE

## 2022-08-16 ENCOUNTER — APPOINTMENT (OUTPATIENT)
Dept: NUCLEAR MEDICINE | Facility: IMAGING CENTER | Age: 87
End: 2022-08-16

## 2022-08-16 DIAGNOSIS — R91.8 OTHER NONSPECIFIC ABNORMAL FINDING OF LUNG FIELD: ICD-10-CM

## 2022-08-16 DIAGNOSIS — H26.9 UNSPECIFIED CATARACT: Chronic | ICD-10-CM

## 2022-08-16 PROCEDURE — 78815 PET IMAGE W/CT SKULL-THIGH: CPT | Mod: 26,PI,MH

## 2022-08-16 PROCEDURE — 78815 PET IMAGE W/CT SKULL-THIGH: CPT

## 2022-08-16 PROCEDURE — A9552: CPT

## 2022-08-17 ENCOUNTER — NON-APPOINTMENT (OUTPATIENT)
Age: 87
End: 2022-08-17

## 2022-08-22 ENCOUNTER — APPOINTMENT (OUTPATIENT)
Dept: PULMONOLOGY | Facility: CLINIC | Age: 87
End: 2022-08-22

## 2022-09-12 LAB
ALBUMIN SERPL ELPH-MCNC: 3.9 G/DL
ALP BLD-CCNC: 147 U/L
ALT SERPL-CCNC: 17 U/L
ANION GAP SERPL CALC-SCNC: 9 MMOL/L
AST SERPL-CCNC: 23 U/L
BASOPHILS # BLD AUTO: 0.03 K/UL
BASOPHILS NFR BLD AUTO: 0.6 %
BILIRUB SERPL-MCNC: 0.3 MG/DL
BUN SERPL-MCNC: 18 MG/DL
CALCIUM SERPL-MCNC: 9.7 MG/DL
CHLORIDE SERPL-SCNC: 103 MMOL/L
CO2 SERPL-SCNC: 28 MMOL/L
CREAT SERPL-MCNC: 0.79 MG/DL
EGFR: 73 ML/MIN/1.73M2
EOSINOPHIL # BLD AUTO: 0.13 K/UL
EOSINOPHIL NFR BLD AUTO: 2.6 %
GLUCOSE SERPL-MCNC: 97 MG/DL
HCT VFR BLD CALC: 41.1 %
HGB BLD-MCNC: 12.7 G/DL
IMM GRANULOCYTES NFR BLD AUTO: 0.2 %
LYMPHOCYTES # BLD AUTO: 1.15 K/UL
LYMPHOCYTES NFR BLD AUTO: 22.9 %
MAN DIFF?: NORMAL
MCHC RBC-ENTMCNC: 30.6 PG
MCHC RBC-ENTMCNC: 30.9 GM/DL
MCV RBC AUTO: 99 FL
MONOCYTES # BLD AUTO: 0.5 K/UL
MONOCYTES NFR BLD AUTO: 9.9 %
NEUTROPHILS # BLD AUTO: 3.21 K/UL
NEUTROPHILS NFR BLD AUTO: 63.8 %
PLATELET # BLD AUTO: 253 K/UL
POTASSIUM SERPL-SCNC: 4.8 MMOL/L
PROT SERPL-MCNC: 7.4 G/DL
RBC # BLD: 4.15 M/UL
RBC # FLD: 14.4 %
SODIUM SERPL-SCNC: 140 MMOL/L
WBC # FLD AUTO: 5.03 K/UL

## 2022-09-13 ENCOUNTER — APPOINTMENT (OUTPATIENT)
Dept: INTERVENTIONAL RADIOLOGY/VASCULAR | Facility: CLINIC | Age: 87
End: 2022-09-13

## 2022-09-13 VITALS
SYSTOLIC BLOOD PRESSURE: 144 MMHG | OXYGEN SATURATION: 96 % | RESPIRATION RATE: 17 BRPM | DIASTOLIC BLOOD PRESSURE: 82 MMHG | HEIGHT: 65 IN | WEIGHT: 162 LBS | HEART RATE: 71 BPM | BODY MASS INDEX: 26.99 KG/M2

## 2022-09-13 LAB — SARS-COV-2 N GENE NPH QL NAA+PROBE: NOT DETECTED

## 2022-09-13 PROCEDURE — 99204 OFFICE O/P NEW MOD 45 MIN: CPT

## 2022-09-14 ENCOUNTER — APPOINTMENT (OUTPATIENT)
Dept: SURGICAL ONCOLOGY | Facility: CLINIC | Age: 87
End: 2022-09-14

## 2022-09-16 ENCOUNTER — RESULT REVIEW (OUTPATIENT)
Age: 87
End: 2022-09-16

## 2022-09-16 ENCOUNTER — OUTPATIENT (OUTPATIENT)
Dept: OUTPATIENT SERVICES | Facility: HOSPITAL | Age: 87
LOS: 1 days | End: 2022-09-16

## 2022-09-16 DIAGNOSIS — H26.9 UNSPECIFIED CATARACT: Chronic | ICD-10-CM

## 2022-09-16 DIAGNOSIS — C50.919 MALIGNANT NEOPLASM OF UNSPECIFIED SITE OF UNSPECIFIED FEMALE BREAST: ICD-10-CM

## 2022-09-16 PROCEDURE — 32408 CORE NDL BX LNG/MED PERQ: CPT

## 2022-09-16 PROCEDURE — 88360 TUMOR IMMUNOHISTOCHEM/MANUAL: CPT | Mod: 26

## 2022-09-16 PROCEDURE — 71045 X-RAY EXAM CHEST 1 VIEW: CPT | Mod: 26

## 2022-09-16 PROCEDURE — 88367 INSITU HYBRIDIZATION AUTO: CPT | Mod: 26

## 2022-09-16 PROCEDURE — 88341 IMHCHEM/IMCYTCHM EA ADD ANTB: CPT | Mod: 26,59

## 2022-09-16 PROCEDURE — 71045 X-RAY EXAM CHEST 1 VIEW: CPT | Mod: 26,77

## 2022-09-16 PROCEDURE — 88312 SPECIAL STAINS GROUP 1: CPT | Mod: 26

## 2022-09-16 PROCEDURE — 88313 SPECIAL STAINS GROUP 2: CPT | Mod: 26

## 2022-09-16 PROCEDURE — 88342 IMHCHEM/IMCYTCHM 1ST ANTB: CPT | Mod: 26,59

## 2022-09-16 PROCEDURE — 88364 INSITU HYBRIDIZATION (FISH): CPT | Mod: 26

## 2022-09-16 PROCEDURE — 88305 TISSUE EXAM BY PATHOLOGIST: CPT | Mod: 26

## 2022-09-16 PROCEDURE — 88365 INSITU HYBRIDIZATION (FISH): CPT | Mod: 26,59

## 2022-09-16 PROCEDURE — 88189 FLOWCYTOMETRY/READ 16 & >: CPT

## 2022-09-16 NOTE — PRE PROCEDURE NOTE - HISTORY OF PRESENT ILLNESS
Interventional Radiology  Pre-Procedure Note    This is a 87y  Female  presenting for lung biopsy    HPI:87 years old female with hx of hypothyroid, left breast cancer diagnosed in 2020, T2N1. Treated with lumpectomy and lymph node dissection and radiation x16. The breast mass was found incidentally on a yearly CT of the chest that she has been getting for years following an aortic aneurysm. Patient is currently maintained on anastrozole.    She had a follow-up CT of the chest for her aortic aneurysm on July 8, which found a new 1.6 x 1.2 cm masslike opacity in the posterior medial right middle lobe. Concerning for malignancy. PET/Ct scan done which demonstrated, "FDG-avid right middle lobe opacity, unchanged in size and appearance from CT chest 7/8/2022 is concerning for a primary lung neoplasm. FDG-avid right apical peripheral pulmonary opacity, also unchanged on CT, is indeterminate. The intensity of metabolism is greater than would be expected with scarring (SUV 4.8; image 46). Differential diagnosis includes synchronous lung neoplasm. Histologic evaluation is recommended.Non-FDG-avid 7 mm left lower lobe subpleural nodule, unchanged as compared to 7/8/2022, is nonspecific.Remainder study demonstrates no evidence of FDG-avid disease."        PAST MEDICAL & SURGICAL HISTORY:  Tremor  hands      Lumbar herniated disc      Herniated cervical disc without myelopathy      History of shingles  left thoracic 2015      Postherpetic neuralgia      Malignant neoplasm  breast      Cataract          Social History:     FAMILY HISTORY:      Allergies: penicillin (Rash)  sulfa drugs (Rash)      Current Medications:     Labs:   CBC for Oncology (09.17.21 @ 15:42)    WBC Count: 4.74 K/uL    RBC Count: 3.93 M/uL    Hemoglobin: 12.4 g/dL    Hematocrit: 39.5 %    Mean Cell Volume: 100.5 fl    Mean Cell Hemoglobin: 31.6 pg    Mean Cell Hemoglobin Conc: 31.4 g/dL    Red Cell Distrib Width: 13.5 %    Platelet Count - Automated: 234 K/uL    Basic Metabolic Panel (09.17.20 @ 11:20)    Sodium, Serum: 140 mmol/L    Potassium, Serum: 4.2 mmol/L    Chloride, Serum: 101 mmol/L    Carbon Dioxide, Serum: 27 mmol/L    Anion Gap, Serum: 12 mmo/L    Blood Urea Nitrogen, Serum: 22 mg/dL    Creatinine, Serum: 0.66 mg/dL    Glucose, Serum: 86 mg/dL    Calcium, Total Serum: 9.3 mg/dL    eGFR if Non : 82: The units for eGFR are ml/min/1.73m2 (normalized body  surface area). The eGFR is calculated from a serum  creatinine using the CKD-EPI equation. Other variables  required for calculation are race, age and sex. Among  patients with chronic kidney disease (CKD), the eGFR is  useful in determining the stage of disease according to  KDOQI CKD classification. All eGFR results are reported  numerically with the following interpretation.    GFR  (ml/min/1.73 m2)          W/KIDNEY DAMAGE    W/O KIDNEY DMG  ==========================================================  >= 90.......................Stage 1..............Normal  60-89.......................Stage 2...........Decreased GFR  30-59.......................Stage 3..............Stage 3  15-29.......................Stage 4..............Stage 4  < 15........................Stage 5..............Stage 5    Each stage of CKD assumes that the associated GFR level  has been in effect for at least 3 months. Determination of  stages one and two (with eGFR > 59ml/min/m2) requires  estimation of kidney damage for at least 3 months as  defined by structural or functional abnormalities.    Limitations: All estimates of GFR will be less accurate  for patients at extremes of muscle mass (including but  not limited to frail elderly, critically ill, or cancer  patients), those with unusual diets, and those with  conditions associated with reduced secretion or  extrarenal elimination of creatinine. The eGFR equation  is not recommended for use in patients with unstable  creatinine levels. mL/min    eGFR if : 95 mL/min            Assessment/Plan:   This is a 87y Female  presents with right apical nodule and RML nodule. Right apical nodule amenable to PNB  Patient presents to IR for CT guided right .  Procedure/ risks/ benefits/ goals/ alternatives were explained. All questions answered. Informed content obtained from patient. Consent placed in chart.

## 2022-09-18 ENCOUNTER — TRANSCRIPTION ENCOUNTER (OUTPATIENT)
Age: 87
End: 2022-09-18

## 2022-09-20 LAB — TM INTERPRETATION: SIGNIFICANT CHANGE UP

## 2022-09-22 LAB — NON-GYNECOLOGICAL CYTOLOGY STUDY: SIGNIFICANT CHANGE UP

## 2022-09-23 ENCOUNTER — NON-APPOINTMENT (OUTPATIENT)
Age: 87
End: 2022-09-23

## 2022-09-23 DIAGNOSIS — R91.1 SOLITARY PULMONARY NODULE: ICD-10-CM

## 2022-10-21 NOTE — REVIEW OF SYSTEMS
[Fatigue: Grade 0] : Fatigue: Grade 0 [Dermatitis Radiation: Grade 0] : Dermatitis Radiation: Grade 0 Pancreatitis

## 2022-10-24 ENCOUNTER — RESULT REVIEW (OUTPATIENT)
Age: 87
End: 2022-10-24

## 2022-10-24 ENCOUNTER — APPOINTMENT (OUTPATIENT)
Dept: MAMMOGRAPHY | Facility: CLINIC | Age: 87
End: 2022-10-24

## 2022-10-24 ENCOUNTER — APPOINTMENT (OUTPATIENT)
Dept: ULTRASOUND IMAGING | Facility: CLINIC | Age: 87
End: 2022-10-24

## 2022-10-24 ENCOUNTER — OUTPATIENT (OUTPATIENT)
Dept: OUTPATIENT SERVICES | Facility: HOSPITAL | Age: 87
LOS: 1 days | End: 2022-10-24
Payer: MEDICARE

## 2022-10-24 DIAGNOSIS — H26.9 UNSPECIFIED CATARACT: Chronic | ICD-10-CM

## 2022-10-24 DIAGNOSIS — C50.919 MALIGNANT NEOPLASM OF UNSPECIFIED SITE OF UNSPECIFIED FEMALE BREAST: ICD-10-CM

## 2022-10-24 PROCEDURE — 77066 DX MAMMO INCL CAD BI: CPT | Mod: 26

## 2022-10-24 PROCEDURE — G0279: CPT | Mod: 26

## 2022-10-24 PROCEDURE — G0279: CPT

## 2022-10-24 PROCEDURE — 76641 ULTRASOUND BREAST COMPLETE: CPT | Mod: 26,50

## 2022-10-24 PROCEDURE — 76641 ULTRASOUND BREAST COMPLETE: CPT

## 2022-10-24 PROCEDURE — 77066 DX MAMMO INCL CAD BI: CPT

## 2022-11-09 ENCOUNTER — APPOINTMENT (OUTPATIENT)
Dept: SURGICAL ONCOLOGY | Facility: CLINIC | Age: 87
End: 2022-11-09

## 2022-11-09 VITALS
HEART RATE: 77 BPM | BODY MASS INDEX: 26.66 KG/M2 | RESPIRATION RATE: 16 BRPM | DIASTOLIC BLOOD PRESSURE: 98 MMHG | WEIGHT: 160 LBS | SYSTOLIC BLOOD PRESSURE: 169 MMHG | TEMPERATURE: 97.6 F | HEIGHT: 65 IN | OXYGEN SATURATION: 97 %

## 2022-11-09 PROCEDURE — 99213 OFFICE O/P EST LOW 20 MIN: CPT

## 2022-11-10 NOTE — PHYSICAL EXAM
[Normal] : supple, no neck mass and thyroid not enlarged [Normal Supraclavicular Lymph Nodes] : normal supraclavicular lymph nodes [Normal Axillary Lymph Nodes] : normal axillary lymph nodes [Normal] : oriented to person, place and time, with appropriate affect [FreeTextEntry1] : SC present for exam  [de-identified] : Normal S1, S2. Regular rate and rhythm; [de-identified] : well healed left breast incision; post radiation changes to the left breast;  No masses or nipple discharge bilaterally  [de-identified] : Clear breath sounds bilaterally, normal resp. rate  [de-identified] : decreased ROM LUE

## 2022-11-10 NOTE — HISTORY OF PRESENT ILLNESS
[de-identified] : Ms. Hennessy is an 87 year old female who presents today for a followup visit secondary to left breast lumpectomy with sentinel node excision completed on 9/24/20.\par \par Pt is s/p left breast lumpectomy with sentinel node excision 9/24/2020. Pathology: Moderately differentiated invasive ductal carcinoma, DCIS. 1/4 nodes positive for metastatic carcinoma\par \par Oncotype Dx=13; Patient completed adjuvant RT under the care of Dr. Saucedo. On  Anastrozole 1 mg daily under the care of Dr. Walker Smyth.  \par \par B/L mammo/sono 10/24/22- No evidence of malignancy. BI-RADS 2\par \par Denies palpable breast masses, nipple discharge, skin dimpling, inversion or breast pain. States she has decreased ROM to the LUQ and experiences left flank/left shoulder pain with certain movements. States she had left flank shingles a while back which was not treated properly.  Does not get relief with Gabapentin. Pt. is willing to try physical therapy. \par \par \par Pertinent Hx:\par She  was referred by Dr. Jose Juan Fu.Patient had recent CT scan to follow an ascending AA . A soft tissue mass was noted in the left breast. A mammogram was performed on July 27 which identified a 2.5 cm suspicious mass. In retrospect this was not felt by the patient.\par \par She underwent a biopsy of the left breast 2:00 revealing invasive ductal carcinoma, moderately differentiated (Er+Pr+Her2-).\par \par Today the pt was w/o any complaints. Denies palpable breast masses, nipple discharge, skin changes, inversion of breast pain. Denies constitutional symptoms. States to have completed \par

## 2022-11-10 NOTE — ASSESSMENT
[FreeTextEntry1] : Impression:\par S/p left lumpectomy and SLNB 9/24/2020 for left breast IDC (Er+Pr+Her2-) \par Tumor stage: pT2 pN1; AJCC Stage: I\par Oncotype DX: 13\par S/p RT; On Anastrozole\par B/L Mammo/sono 10/2022- BI-RADS 2 \par Decreased ROM LUE\par \par \par Plan:\par Continue f/u with medical oncology. \par B/L mammo/sono 10/2023- ordered\par RTO yearly\par \par \par All medical entries were at my, Dr. Eduardo Hurtado, direction. I have reviewed the chart and agree that the record accurately reflects my personal performance of the history, physical exam, assessment and plan. Our office Nurse Practitioner was present of the duration of the office visit.

## 2022-12-27 ENCOUNTER — OUTPATIENT (OUTPATIENT)
Dept: OUTPATIENT SERVICES | Facility: HOSPITAL | Age: 87
LOS: 1 days | Discharge: HOME | End: 2022-12-27

## 2022-12-27 DIAGNOSIS — C50.919 MALIGNANT NEOPLASM OF UNSPECIFIED SITE OF UNSPECIFIED FEMALE BREAST: ICD-10-CM

## 2022-12-27 DIAGNOSIS — H26.9 UNSPECIFIED CATARACT: Chronic | ICD-10-CM

## 2023-01-04 ENCOUNTER — APPOINTMENT (OUTPATIENT)
Dept: HEMATOLOGY ONCOLOGY | Facility: CLINIC | Age: 88
End: 2023-01-04
Payer: MEDICARE

## 2023-01-04 VITALS
SYSTOLIC BLOOD PRESSURE: 166 MMHG | BODY MASS INDEX: 27 KG/M2 | TEMPERATURE: 97 F | DIASTOLIC BLOOD PRESSURE: 80 MMHG | HEART RATE: 70 BPM | OXYGEN SATURATION: 97 % | RESPIRATION RATE: 16 BRPM | WEIGHT: 162.04 LBS | HEIGHT: 65 IN

## 2023-01-04 PROCEDURE — 99214 OFFICE O/P EST MOD 30 MIN: CPT

## 2023-01-04 NOTE — ASSESSMENT
[FreeTextEntry1] : She is a 86 y/o  F s/p lumpectomy with sentinel lymph node biopsy T2N1 that is ER/ [place] positive Czj2puv negative. She has been on anastrozole since 11/2020. We reviewed her past CT chest and pet/ CT and her wishes. We reviewed conservative follow up CT chest to ensure no additional findings that would indicate malignancy to help with further planning with understanding she will not want another lung biopsy or Pet/ CT. After reviewing her CT and the rationale for follow up: she agreed to repeat lung CT. We reviewed her breast cancer and continuation of the endocrine therapy as long as she is tolerating. Reviewed exercise as tolerated. She is up to date with breast imaging and no new change on exam. Questions answered to her satisfaction. She is agreeable with plan. Next follow up in 6 months to 1 year but earlier if any new symptoms.\par

## 2023-01-04 NOTE — REVIEW OF SYSTEMS
[Skin Rash] : no skin rash [Skin Wound] : no skin wound [Confused] : no confusion [Dizziness] : no dizziness [Fainting] : no fainting [Difficulty Walking] : no difficulty walking [Negative] : Allergic/Immunologic [de-identified] : pain over the LUE  [de-identified] : resting tremors over the BUE

## 2023-01-04 NOTE — PHYSICAL EXAM
[Restricted in physically strenuous activity but ambulatory and able to carry out work of a light or sedentary nature] : Status 1- Restricted in physically strenuous activity but ambulatory and able to carry out work of a light or sedentary nature, e.g., light house work, office work [Normal] : affect appropriate [de-identified] : breast lumpectomy; no abnl masses or palpable axillary LN  [de-identified] : resting tremors over the BUE; strength 4/5 BUE and BLE

## 2023-01-04 NOTE — CONSULT LETTER
[Dear  ___] : Dear  [unfilled], [Courtesy Letter:] : I had the pleasure of seeing your patient, [unfilled], in my office today. [Please see my note below.] : Please see my note below. [Consult Closing:] : Thank you very much for allowing me to participate in the care of this patient.  If you have any questions, please do not hesitate to contact me. [Sincerely,] : Sincerely, [FreeTextEntry2] : Jose Juan Fu MD\par [address]\[place] [address]  [FreeTextEntry3] : Walker Smyth MD\par Attending\par [facility]\par
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
## 2023-01-08 ENCOUNTER — OUTPATIENT (OUTPATIENT)
Dept: OUTPATIENT SERVICES | Facility: HOSPITAL | Age: 88
LOS: 1 days | End: 2023-01-08
Payer: MEDICARE

## 2023-01-08 ENCOUNTER — APPOINTMENT (OUTPATIENT)
Dept: CT IMAGING | Facility: IMAGING CENTER | Age: 88
End: 2023-01-08
Payer: MEDICARE

## 2023-01-08 DIAGNOSIS — H26.9 UNSPECIFIED CATARACT: Chronic | ICD-10-CM

## 2023-01-08 DIAGNOSIS — C50.919 MALIGNANT NEOPLASM OF UNSPECIFIED SITE OF UNSPECIFIED FEMALE BREAST: ICD-10-CM

## 2023-01-08 PROCEDURE — 71250 CT THORAX DX C-: CPT | Mod: 26,MH

## 2023-01-08 PROCEDURE — 71250 CT THORAX DX C-: CPT

## 2023-01-19 ENCOUNTER — NON-APPOINTMENT (OUTPATIENT)
Age: 88
End: 2023-01-19

## 2023-03-24 ENCOUNTER — RX RENEWAL (OUTPATIENT)
Age: 88
End: 2023-03-24

## 2023-05-16 ENCOUNTER — NON-APPOINTMENT (OUTPATIENT)
Age: 88
End: 2023-05-16

## 2023-06-12 ENCOUNTER — APPOINTMENT (OUTPATIENT)
Dept: CT IMAGING | Facility: CLINIC | Age: 88
End: 2023-06-12
Payer: MEDICARE

## 2023-06-12 ENCOUNTER — OUTPATIENT (OUTPATIENT)
Dept: OUTPATIENT SERVICES | Facility: HOSPITAL | Age: 88
LOS: 1 days | End: 2023-06-12
Payer: MEDICARE

## 2023-06-12 DIAGNOSIS — H26.9 UNSPECIFIED CATARACT: Chronic | ICD-10-CM

## 2023-06-12 DIAGNOSIS — C50.919 MALIGNANT NEOPLASM OF UNSPECIFIED SITE OF UNSPECIFIED FEMALE BREAST: ICD-10-CM

## 2023-06-12 PROCEDURE — 71250 CT THORAX DX C-: CPT | Mod: 26,MH

## 2023-06-12 PROCEDURE — 71250 CT THORAX DX C-: CPT

## 2023-07-11 ENCOUNTER — NON-APPOINTMENT (OUTPATIENT)
Age: 88
End: 2023-07-11

## 2023-07-17 ENCOUNTER — APPOINTMENT (OUTPATIENT)
Dept: OPHTHALMOLOGY | Facility: CLINIC | Age: 88
End: 2023-07-17
Payer: MEDICARE

## 2023-07-17 ENCOUNTER — NON-APPOINTMENT (OUTPATIENT)
Age: 88
End: 2023-07-17

## 2023-07-17 PROCEDURE — 92002 INTRM OPH EXAM NEW PATIENT: CPT

## 2023-08-01 NOTE — VITALS
[Maximal Pain Intensity: 7/10] : 7/10 [Pain Description/Quality: ___] : Pain description/quality: [unfilled] [Pain Duration: ___] : Pain duration: [unfilled] [Pain Location: ___] : Pain Location: [unfilled] [NoTreatment Scheduled] : no treatment scheduled [70: Cares for self; unalbe to carry on normal activity or do active work.] : 70: Cares for self; unable to carry on normal activity or do active work. [ECOG Performance Status: 2 - Ambulatory and capable of all self care but unable to carry out any work activities] : Performance Status: 2 - Ambulatory and capable of all self care but unable to carry out any work activities. Up and about more than 50% of waking hours [Pain Interferes with ADLs] : Pain does not interfere with activities of daily living O-L Flap Text: Due to geometric and functional constraints, a flap reconstruction was performed to reconstruct the defect. To that end, adjacent tissue was incised and carried over to close the defect in the following manner: The defect edges were debeveled with a #15 scalpel blade.  Given the location of the defect, shape of the defect and the proximity to free margins an O-L flap was deemed most appropriate.  Using a sterile surgical marker, an appropriate advancement flap was drawn incorporating the defect and placing the expected incisions within the relaxed skin tension lines where possible.    The area thus outlined was incised deep to adipose tissue with a #15 scalpel blade.  The skin margins were undermined to an appropriate distance in all directions utilizing iris scissors.

## 2023-08-22 ENCOUNTER — NON-APPOINTMENT (OUTPATIENT)
Age: 88
End: 2023-08-22

## 2023-08-24 ENCOUNTER — OUTPATIENT (OUTPATIENT)
Dept: OUTPATIENT SERVICES | Facility: HOSPITAL | Age: 88
LOS: 1 days | Discharge: HOME | End: 2023-08-24

## 2023-08-24 DIAGNOSIS — C50.919 MALIGNANT NEOPLASM OF UNSPECIFIED SITE OF UNSPECIFIED FEMALE BREAST: ICD-10-CM

## 2023-08-24 DIAGNOSIS — H26.9 UNSPECIFIED CATARACT: Chronic | ICD-10-CM

## 2023-09-01 ENCOUNTER — RESULT REVIEW (OUTPATIENT)
Age: 88
End: 2023-09-01

## 2023-09-01 ENCOUNTER — APPOINTMENT (OUTPATIENT)
Dept: HEMATOLOGY ONCOLOGY | Facility: CLINIC | Age: 88
End: 2023-09-01
Payer: MEDICARE

## 2023-09-01 VITALS
OXYGEN SATURATION: 98 % | TEMPERATURE: 97.4 F | BODY MASS INDEX: 25.45 KG/M2 | SYSTOLIC BLOOD PRESSURE: 168 MMHG | HEART RATE: 80 BPM | WEIGHT: 152.78 LBS | HEIGHT: 65 IN | DIASTOLIC BLOOD PRESSURE: 103 MMHG | RESPIRATION RATE: 17 BRPM

## 2023-09-01 LAB
ALBUMIN SERPL ELPH-MCNC: 3.6 G/DL
ALP BLD-CCNC: 139 U/L
ALT SERPL-CCNC: 17 U/L
ANION GAP SERPL CALC-SCNC: 12 MMOL/L
AST SERPL-CCNC: 21 U/L
BASOPHILS # BLD AUTO: 0.03 K/UL — SIGNIFICANT CHANGE UP (ref 0–0.2)
BASOPHILS NFR BLD AUTO: 0.4 % — SIGNIFICANT CHANGE UP (ref 0–2)
BILIRUB SERPL-MCNC: 0.3 MG/DL
BUN SERPL-MCNC: 15 MG/DL
CALCIUM SERPL-MCNC: 9.5 MG/DL
CHLORIDE SERPL-SCNC: 100 MMOL/L
CO2 SERPL-SCNC: 26 MMOL/L
CREAT SERPL-MCNC: 0.66 MG/DL
EGFR: 85 ML/MIN/1.73M2
EOSINOPHIL # BLD AUTO: 0.09 K/UL — SIGNIFICANT CHANGE UP (ref 0–0.5)
EOSINOPHIL NFR BLD AUTO: 1.3 % — SIGNIFICANT CHANGE UP (ref 0–6)
GLUCOSE SERPL-MCNC: 100 MG/DL
HCT VFR BLD CALC: 38.2 % — SIGNIFICANT CHANGE UP (ref 34.5–45)
HGB BLD-MCNC: 11.8 G/DL — SIGNIFICANT CHANGE UP (ref 11.5–15.5)
IMM GRANULOCYTES NFR BLD AUTO: 0.6 % — SIGNIFICANT CHANGE UP (ref 0–0.9)
LYMPHOCYTES # BLD AUTO: 1.06 K/UL — SIGNIFICANT CHANGE UP (ref 1–3.3)
LYMPHOCYTES # BLD AUTO: 15.9 % — SIGNIFICANT CHANGE UP (ref 13–44)
MCHC RBC-ENTMCNC: 28.6 PG — SIGNIFICANT CHANGE UP (ref 27–34)
MCHC RBC-ENTMCNC: 30.9 G/DL — LOW (ref 32–36)
MCV RBC AUTO: 92.7 FL — SIGNIFICANT CHANGE UP (ref 80–100)
MONOCYTES # BLD AUTO: 0.59 K/UL — SIGNIFICANT CHANGE UP (ref 0–0.9)
MONOCYTES NFR BLD AUTO: 8.8 % — SIGNIFICANT CHANGE UP (ref 2–14)
NEUTROPHILS # BLD AUTO: 4.86 K/UL — SIGNIFICANT CHANGE UP (ref 1.8–7.4)
NEUTROPHILS NFR BLD AUTO: 73 % — SIGNIFICANT CHANGE UP (ref 43–77)
NRBC # BLD: 0 /100 WBCS — SIGNIFICANT CHANGE UP (ref 0–0)
PLATELET # BLD AUTO: 328 K/UL — SIGNIFICANT CHANGE UP (ref 150–400)
POTASSIUM SERPL-SCNC: 4.9 MMOL/L
PROT SERPL-MCNC: 7.4 G/DL
RBC # BLD: 4.12 M/UL — SIGNIFICANT CHANGE UP (ref 3.8–5.2)
RBC # FLD: 15 % — HIGH (ref 10.3–14.5)
SODIUM SERPL-SCNC: 138 MMOL/L
WBC # BLD: 6.67 K/UL — SIGNIFICANT CHANGE UP (ref 3.8–10.5)
WBC # FLD AUTO: 6.67 K/UL — SIGNIFICANT CHANGE UP (ref 3.8–10.5)

## 2023-09-01 PROCEDURE — 99215 OFFICE O/P EST HI 40 MIN: CPT

## 2023-09-01 NOTE — HISTORY OF PRESENT ILLNESS
[Disease: _____________________] : Disease: [unfilled] [T: ___] : T[unfilled] [N: ___] : N[unfilled] [AJCC Stage: ____] : AJCC Stage: [unfilled] [de-identified] : Age 85: left breast cancer She had been followed for ascending aorta aneurysm with CT imaging. CT from 7/16/2020 showed 4 cm ascending aorta, new 1.9 cm left breast mass, and mild multifocal tree in bud type nodularity in the lungs, and 4 mm left lower lobe nodule. She had not been having her annual mammograms for years. Diagnostic mammogram done on 7/27/2020 showed heterogeneously dense breasts with 2.5 cm oval lobulated and spiculated high density mass in the left breast upper outer quadrant middle depth.  The breast biopsy showed invasive moderately differentiated ductal carcinoma ER %, DE 41-50%, Ziu1qgs negative and Ki67 of 30% and DCIS. She underwent left lumpectomy with sentinel lymph node biopsy with Dr Hurtado on 9/24/2020. The pathology showed invasive ductal carcinoma, moderately differentiated measuring 3.4 cm metastatic to 1 out of 4 lymph nodes (invasive component 1 cm). Oncotype had low score of 13 and data from [employer] used: recommended endocrine therapy for postmenopausal patient. She completed RT to the surgical site. Anastrozole was continued. She had CT chest to follow up ascending aortic dilatation and had new lung lesion noted: seen by Dr Lisker and had biopsy done which was benign along with Pet/ CT done in August: pet avid lung lesion. She has been having CT follow up of the chest and has not agreed to another biopsy.  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 No anemia. Having work up with rheumatology with blood work. She will have repeat CT chest with contrast to evaluate for adenopathy. Denies any new cough or chest wall pain. She will have mammogram this October. Still with pain over the L shoulder from past shingles. She feels she has been having a rough time and feels she wants to try to resolve one situation at a time. Will think about the Signatera test for  circulating tumor DNA for the lung mass for another time.

## 2023-09-01 NOTE — REVIEW OF SYSTEMS
[Diarrhea: Grade 0] : Diarrhea: Grade 0 [Joint Pain] : joint pain [Joint Stiffness] : no joint stiffness [Muscle Pain] : no muscle pain [Muscle Weakness] : no muscle weakness [Confused] : no confusion [Dizziness] : no dizziness [Fainting] : no fainting [Difficulty Walking] : no difficulty walking [Negative] : Allergic/Immunologic [FreeTextEntry9] : pain and swelling over the knees and pain over the L shoulder [de-identified] : tremor

## 2023-09-01 NOTE — ASSESSMENT
[FreeTextEntry1] : She is a 89 y/o  F s/p lumpectomy with sentinel lymph node biopsy T2N1 that is ER/ [place] positive Rpy1upj negative. She has been on anastrozole since 11/2020. She has no new signs or symptoms of breast cancer recurrence. She will have mammogram done in October. Reviewed CMP and CBC done with PCP in July and WNL. She is willing to continue anastrozole. Next follow up in 6 months but earlier if any new symptoms.  Pleural lung lesion: R: 6/2023 scan stable. She does not want another biopsy to further evaluate lesion. Reviewed referral to CT surgery to evaluate lesion. Also reviewed Signatara testing to evaluate for circulating tumor DNA. Currently she is asymptomatic and does not want to pursue testing now. She will let us know if she wants to have testing done.   IgG kappa faint band from 7/2023 work up: CBC and CMP WNL and no new bone pain. 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 Reviewed potential bone marrow biopsy. Will repeat SPEP to get M spike and free light chain ratio. Questions answered to her satisfaction. She is agreeable with plan.

## 2023-09-01 NOTE — PHYSICAL EXAM
[Restricted in physically strenuous activity but ambulatory and able to carry out work of a light or sedentary nature] : Status 1- Restricted in physically strenuous activity but ambulatory and able to carry out work of a light or sedentary nature, e.g., light house work, office work [Normal] : affect appropriate [de-identified] : resting tremors over the BUE; strength 4/5 BUE and BLE [de-identified] : breast lumpectomy; no abnl masses or palpable axillary LN

## 2023-09-06 LAB
ALBUMIN MFR SERPL ELPH: 42.3 %
ALBUMIN SERPL-MCNC: 3.1 G/DL
ALBUMIN/GLOB SERPL: 0.7 RATIO
ALPHA1 GLOB MFR SERPL ELPH: 4.9 %
ALPHA1 GLOB SERPL ELPH-MCNC: 0.4 G/DL
ALPHA2 GLOB MFR SERPL ELPH: 13.6 %
ALPHA2 GLOB SERPL ELPH-MCNC: 1 G/DL
B-GLOBULIN MFR SERPL ELPH: 12.2 %
B-GLOBULIN SERPL ELPH-MCNC: 0.9 G/DL
DEPRECATED KAPPA LC FREE/LAMBDA SER: 1.75 RATIO
GAMMA GLOB FLD ELPH-MCNC: 2 G/DL
GAMMA GLOB MFR SERPL ELPH: 27 %
IGA SER QL IEP: 292 MG/DL
IGG SER QL IEP: 2058 MG/DL
IGM SER QL IEP: 148 MG/DL
INTERPRETATION SERPL IEP-IMP: NORMAL
KAPPA LC CSF-MCNC: 4.2 MG/DL
KAPPA LC SERPL-MCNC: 7.34 MG/DL
M PROTEIN MFR SERPL ELPH: NORMAL
M PROTEIN SPEC IFE-MCNC: NORMAL
MONOCLON BAND OBS SERPL: NORMAL
PROT SERPL-MCNC: 7.4 G/DL
PROT SERPL-MCNC: 7.4 G/DL

## 2023-09-19 ENCOUNTER — NON-APPOINTMENT (OUTPATIENT)
Age: 88
End: 2023-09-19

## 2023-10-24 ENCOUNTER — APPOINTMENT (OUTPATIENT)
Dept: ORTHOPEDIC SURGERY | Facility: CLINIC | Age: 88
End: 2023-10-24
Payer: MEDICARE

## 2023-10-24 ENCOUNTER — NON-APPOINTMENT (OUTPATIENT)
Age: 88
End: 2023-10-24

## 2023-10-24 VITALS
HEIGHT: 65 IN | WEIGHT: 152 LBS | BODY MASS INDEX: 25.33 KG/M2 | HEART RATE: 88 BPM | SYSTOLIC BLOOD PRESSURE: 171 MMHG | DIASTOLIC BLOOD PRESSURE: 93 MMHG

## 2023-10-24 DIAGNOSIS — M25.569 PAIN IN UNSPECIFIED KNEE: ICD-10-CM

## 2023-10-24 DIAGNOSIS — M25.462 EFFUSION, LEFT KNEE: ICD-10-CM

## 2023-10-24 PROCEDURE — 73562 X-RAY EXAM OF KNEE 3: CPT | Mod: LT

## 2023-10-24 PROCEDURE — 20610 DRAIN/INJ JOINT/BURSA W/O US: CPT | Mod: LT

## 2023-10-24 PROCEDURE — 99204 OFFICE O/P NEW MOD 45 MIN: CPT | Mod: 25

## 2023-11-30 ENCOUNTER — APPOINTMENT (OUTPATIENT)
Dept: ULTRASOUND IMAGING | Facility: CLINIC | Age: 88
End: 2023-11-30
Payer: MEDICARE

## 2023-11-30 ENCOUNTER — RESULT REVIEW (OUTPATIENT)
Age: 88
End: 2023-11-30

## 2023-11-30 ENCOUNTER — APPOINTMENT (OUTPATIENT)
Dept: MAMMOGRAPHY | Facility: CLINIC | Age: 88
End: 2023-11-30
Payer: MEDICARE

## 2023-11-30 ENCOUNTER — OUTPATIENT (OUTPATIENT)
Dept: OUTPATIENT SERVICES | Facility: HOSPITAL | Age: 88
LOS: 1 days | End: 2023-11-30
Payer: MEDICARE

## 2023-11-30 DIAGNOSIS — H26.9 UNSPECIFIED CATARACT: Chronic | ICD-10-CM

## 2023-11-30 DIAGNOSIS — Z08 ENCOUNTER FOR FOLLOW-UP EXAMINATION AFTER COMPLETED TREATMENT FOR MALIGNANT NEOPLASM: ICD-10-CM

## 2023-11-30 PROCEDURE — 77066 DX MAMMO INCL CAD BI: CPT | Mod: 26

## 2023-11-30 PROCEDURE — 76641 ULTRASOUND BREAST COMPLETE: CPT | Mod: 26,50,3G

## 2023-11-30 PROCEDURE — G0279: CPT | Mod: 26

## 2023-11-30 PROCEDURE — 76641 ULTRASOUND BREAST COMPLETE: CPT

## 2023-11-30 PROCEDURE — 77066 DX MAMMO INCL CAD BI: CPT

## 2023-11-30 PROCEDURE — G0279: CPT

## 2023-12-04 PROBLEM — Z08 ENCOUNTER FOR FOLLOW-UP SURVEILLANCE OF BREAST CANCER: Status: ACTIVE | Noted: 2021-03-24

## 2023-12-05 ENCOUNTER — APPOINTMENT (OUTPATIENT)
Dept: SURGICAL ONCOLOGY | Facility: CLINIC | Age: 88
End: 2023-12-05
Payer: MEDICARE

## 2023-12-05 VITALS
OXYGEN SATURATION: 97 % | HEART RATE: 86 BPM | HEIGHT: 65 IN | RESPIRATION RATE: 16 BRPM | BODY MASS INDEX: 24.99 KG/M2 | DIASTOLIC BLOOD PRESSURE: 96 MMHG | WEIGHT: 150 LBS | SYSTOLIC BLOOD PRESSURE: 170 MMHG

## 2023-12-05 DIAGNOSIS — Z85.3 ENCOUNTER FOR FOLLOW-UP EXAMINATION AFTER COMPLETED TREATMENT FOR MALIGNANT NEOPLASM: ICD-10-CM

## 2023-12-05 DIAGNOSIS — Z08 ENCOUNTER FOR FOLLOW-UP EXAMINATION AFTER COMPLETED TREATMENT FOR MALIGNANT NEOPLASM: ICD-10-CM

## 2023-12-05 PROCEDURE — 99213 OFFICE O/P EST LOW 20 MIN: CPT

## 2023-12-06 ENCOUNTER — APPOINTMENT (OUTPATIENT)
Dept: ORTHOPEDIC SURGERY | Facility: CLINIC | Age: 88
End: 2023-12-06
Payer: MEDICARE

## 2023-12-06 PROCEDURE — 99213 OFFICE O/P EST LOW 20 MIN: CPT

## 2023-12-06 PROCEDURE — 73564 X-RAY EXAM KNEE 4 OR MORE: CPT | Mod: LT

## 2023-12-13 ENCOUNTER — OUTPATIENT (OUTPATIENT)
Dept: OUTPATIENT SERVICES | Facility: HOSPITAL | Age: 88
LOS: 1 days | Discharge: HOME | End: 2023-12-13

## 2023-12-13 DIAGNOSIS — C50.919 MALIGNANT NEOPLASM OF UNSPECIFIED SITE OF UNSPECIFIED FEMALE BREAST: ICD-10-CM

## 2023-12-13 DIAGNOSIS — H26.9 UNSPECIFIED CATARACT: Chronic | ICD-10-CM

## 2023-12-29 ENCOUNTER — RESULT REVIEW (OUTPATIENT)
Age: 88
End: 2023-12-29

## 2023-12-29 ENCOUNTER — APPOINTMENT (OUTPATIENT)
Dept: HEMATOLOGY ONCOLOGY | Facility: CLINIC | Age: 88
End: 2023-12-29
Payer: MEDICARE

## 2023-12-29 ENCOUNTER — LABORATORY RESULT (OUTPATIENT)
Age: 88
End: 2023-12-29

## 2023-12-29 VITALS
BODY MASS INDEX: 25.89 KG/M2 | OXYGEN SATURATION: 96 % | WEIGHT: 155.4 LBS | RESPIRATION RATE: 17 BRPM | SYSTOLIC BLOOD PRESSURE: 178 MMHG | HEIGHT: 65 IN | HEART RATE: 80 BPM | DIASTOLIC BLOOD PRESSURE: 97 MMHG | TEMPERATURE: 97 F

## 2023-12-29 DIAGNOSIS — I71.20 THORACIC AORTIC ANEURYSM, WITHOUT RUPTURE, UNSPECIFIED: ICD-10-CM

## 2023-12-29 DIAGNOSIS — D47.2 MONOCLONAL GAMMOPATHY: ICD-10-CM

## 2023-12-29 LAB
BASOPHILS # BLD AUTO: 0.03 K/UL — SIGNIFICANT CHANGE UP (ref 0–0.2)
BASOPHILS # BLD AUTO: 0.03 K/UL — SIGNIFICANT CHANGE UP (ref 0–0.2)
BASOPHILS NFR BLD AUTO: 0.5 % — SIGNIFICANT CHANGE UP (ref 0–2)
BASOPHILS NFR BLD AUTO: 0.5 % — SIGNIFICANT CHANGE UP (ref 0–2)
EOSINOPHIL # BLD AUTO: 0.13 K/UL — SIGNIFICANT CHANGE UP (ref 0–0.5)
EOSINOPHIL # BLD AUTO: 0.13 K/UL — SIGNIFICANT CHANGE UP (ref 0–0.5)
EOSINOPHIL NFR BLD AUTO: 2.4 % — SIGNIFICANT CHANGE UP (ref 0–6)
EOSINOPHIL NFR BLD AUTO: 2.4 % — SIGNIFICANT CHANGE UP (ref 0–6)
HCT VFR BLD CALC: 38.4 % — SIGNIFICANT CHANGE UP (ref 34.5–45)
HCT VFR BLD CALC: 38.4 % — SIGNIFICANT CHANGE UP (ref 34.5–45)
HGB BLD-MCNC: 12.1 G/DL — SIGNIFICANT CHANGE UP (ref 11.5–15.5)
HGB BLD-MCNC: 12.1 G/DL — SIGNIFICANT CHANGE UP (ref 11.5–15.5)
IMM GRANULOCYTES NFR BLD AUTO: 0.2 % — SIGNIFICANT CHANGE UP (ref 0–0.9)
IMM GRANULOCYTES NFR BLD AUTO: 0.2 % — SIGNIFICANT CHANGE UP (ref 0–0.9)
LYMPHOCYTES # BLD AUTO: 1.23 K/UL — SIGNIFICANT CHANGE UP (ref 1–3.3)
LYMPHOCYTES # BLD AUTO: 1.23 K/UL — SIGNIFICANT CHANGE UP (ref 1–3.3)
LYMPHOCYTES # BLD AUTO: 22.4 % — SIGNIFICANT CHANGE UP (ref 13–44)
LYMPHOCYTES # BLD AUTO: 22.4 % — SIGNIFICANT CHANGE UP (ref 13–44)
MCHC RBC-ENTMCNC: 28.8 PG — SIGNIFICANT CHANGE UP (ref 27–34)
MCHC RBC-ENTMCNC: 28.8 PG — SIGNIFICANT CHANGE UP (ref 27–34)
MCHC RBC-ENTMCNC: 31.5 G/DL — LOW (ref 32–36)
MCHC RBC-ENTMCNC: 31.5 G/DL — LOW (ref 32–36)
MCV RBC AUTO: 91.4 FL — SIGNIFICANT CHANGE UP (ref 80–100)
MCV RBC AUTO: 91.4 FL — SIGNIFICANT CHANGE UP (ref 80–100)
MONOCYTES # BLD AUTO: 0.42 K/UL — SIGNIFICANT CHANGE UP (ref 0–0.9)
MONOCYTES # BLD AUTO: 0.42 K/UL — SIGNIFICANT CHANGE UP (ref 0–0.9)
MONOCYTES NFR BLD AUTO: 7.6 % — SIGNIFICANT CHANGE UP (ref 2–14)
MONOCYTES NFR BLD AUTO: 7.6 % — SIGNIFICANT CHANGE UP (ref 2–14)
NEUTROPHILS # BLD AUTO: 3.68 K/UL — SIGNIFICANT CHANGE UP (ref 1.8–7.4)
NEUTROPHILS # BLD AUTO: 3.68 K/UL — SIGNIFICANT CHANGE UP (ref 1.8–7.4)
NEUTROPHILS NFR BLD AUTO: 66.9 % — SIGNIFICANT CHANGE UP (ref 43–77)
NEUTROPHILS NFR BLD AUTO: 66.9 % — SIGNIFICANT CHANGE UP (ref 43–77)
NRBC # BLD: 0 /100 WBCS — SIGNIFICANT CHANGE UP (ref 0–0)
NRBC # BLD: 0 /100 WBCS — SIGNIFICANT CHANGE UP (ref 0–0)
PLATELET # BLD AUTO: 329 K/UL — SIGNIFICANT CHANGE UP (ref 150–400)
PLATELET # BLD AUTO: 329 K/UL — SIGNIFICANT CHANGE UP (ref 150–400)
RBC # BLD: 4.2 M/UL — SIGNIFICANT CHANGE UP (ref 3.8–5.2)
RBC # BLD: 4.2 M/UL — SIGNIFICANT CHANGE UP (ref 3.8–5.2)
RBC # FLD: 15.1 % — HIGH (ref 10.3–14.5)
RBC # FLD: 15.1 % — HIGH (ref 10.3–14.5)
WBC # BLD: 5.5 K/UL — SIGNIFICANT CHANGE UP (ref 3.8–10.5)
WBC # BLD: 5.5 K/UL — SIGNIFICANT CHANGE UP (ref 3.8–10.5)
WBC # FLD AUTO: 5.5 K/UL — SIGNIFICANT CHANGE UP (ref 3.8–10.5)
WBC # FLD AUTO: 5.5 K/UL — SIGNIFICANT CHANGE UP (ref 3.8–10.5)

## 2023-12-29 PROCEDURE — 99214 OFFICE O/P EST MOD 30 MIN: CPT

## 2023-12-29 NOTE — ASSESSMENT
[FreeTextEntry1] : She is a 89 y/o  F s/p lumpectomy with sentinel lymph node biopsy T2N1 that is ER/ [place] positive Xyw6stq negative. She has been on anastrozole since 11/2020. She has no new signs or symptoms of breast cancer recurrence. She will continue with anastrozole. She is up to date with exam without any new findings. Next follow up in 6 months but earlier if any new symptoms.  Aortic aneurysm: followed by CT chest done by PCP: will obtain interval imaging for follow up.   Pleural lung lesion: R: 6/2023 scan stable. She does not want any biopsy repeated nor Signatera test but would think about options if the mass was bigger. Remains asymptomatic. Will have follow up CT in January/ February.   IgG kappa faint band from 7/2023 work up: CBC and CMP WNL and no new bone pain. Will repeat IPEP along with CBC.

## 2023-12-29 NOTE — HISTORY OF PRESENT ILLNESS
[Disease: _____________________] : Disease: [unfilled] [T: ___] : T[unfilled] [N: ___] : N[unfilled] [AJCC Stage: ____] : AJCC Stage: [unfilled] [de-identified] : Age 85: left breast cancer She had been followed for ascending aorta aneurysm with CT imaging. CT from 7/16/2020 showed 4 cm ascending aorta, new 1.9 cm left breast mass, and mild multifocal tree in bud type nodularity in the lungs, and 4 mm left lower lobe nodule. She had not been having her annual mammograms for years. Diagnostic mammogram done on 7/27/2020 showed heterogeneously dense breasts with 2.5 cm oval lobulated and spiculated high density mass in the left breast upper outer quadrant middle depth.  The breast biopsy showed invasive moderately differentiated ductal carcinoma ER %, UT 41-50%, Llx4fbr negative and Ki67 of 30% and DCIS. She underwent left lumpectomy with sentinel lymph node biopsy with Dr Hurtado on 9/24/2020. The pathology showed invasive ductal carcinoma, moderately differentiated measuring 3.4 cm metastatic to 1 out of 4 lymph nodes (invasive component 1 cm). Oncotype had low score of 13 and data from [employer] used: recommended endocrine therapy for postmenopausal patient. She completed RT to the surgical site. Anastrozole was continued. She had CT chest to follow up ascending aortic dilatation and had new lung lesion noted: seen by Dr Lisker and had biopsy done which was benign along with Pet/ CT done in August: pet avid lung lesion. She has been having CT follow up of the chest and has not agreed to another biopsy.  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 Has tried injections and currently taking ibuprofen 1 to 2 times/ day. She is hoping it will improve but wondering if there are any medications that the rheumatologist is recommending that she can use: hydroxychloroquine, methotrexate, leflunamide. She denies any new cough. She has not had any interval CT imaging: her PCP may be moving to another practice. She had follow up with Dr Hurtado. Tolerating anastrozole M to F. Has baseline tremors over BUE.

## 2023-12-29 NOTE — PHYSICAL EXAM
[Ambulatory and capable of all self care but unable to carry out any work activities] : Status 2- Ambulatory and capable of all self care but unable to carry out any work activities. Up and about more than 50% of waking hours [Normal] : affect appropriate [de-identified] : breast lumpectomy; no abnl masses or palpable axillary LN  [de-identified] : resting tremors over the BUE; strength 4/5 BUE and BLE

## 2023-12-29 NOTE — REVIEW OF SYSTEMS
[Diarrhea: Grade 0] : Diarrhea: Grade 0 [Negative] : Allergic/Immunologic [Joint Pain] : joint pain [Joint Stiffness] : joint stiffness [Muscle Pain] : muscle pain [Muscle Weakness] : no muscle weakness [Confused] : no confusion [Dizziness] : no dizziness [Fainting] : no fainting [Difficulty Walking] : no difficulty walking [FreeTextEntry9] : Knee pain due to arthritis  [de-identified] : baseline tremors over the BUE

## 2024-01-02 LAB
ALBUMIN SERPL ELPH-MCNC: 4 G/DL
ALP BLD-CCNC: 132 U/L
ALT SERPL-CCNC: 12 U/L
ANION GAP SERPL CALC-SCNC: 10 MMOL/L
AST SERPL-CCNC: 21 U/L
BILIRUB SERPL-MCNC: 0.3 MG/DL
BUN SERPL-MCNC: 20 MG/DL
CALCIUM SERPL-MCNC: 9.4 MG/DL
CHLORIDE SERPL-SCNC: 100 MMOL/L
CO2 SERPL-SCNC: 27 MMOL/L
CREAT SERPL-MCNC: 0.69 MG/DL
EGFR: 84 ML/MIN/1.73M2
GLUCOSE SERPL-MCNC: 100 MG/DL
POTASSIUM SERPL-SCNC: 4.2 MMOL/L
PROT SERPL-MCNC: 8.3 G/DL
SODIUM SERPL-SCNC: 137 MMOL/L

## 2024-01-03 ENCOUNTER — APPOINTMENT (OUTPATIENT)
Dept: ORTHOPEDIC SURGERY | Facility: CLINIC | Age: 89
End: 2024-01-03
Payer: MEDICARE

## 2024-01-03 DIAGNOSIS — M17.12 UNILATERAL PRIMARY OSTEOARTHRITIS, LEFT KNEE: ICD-10-CM

## 2024-01-03 PROCEDURE — 20610 DRAIN/INJ JOINT/BURSA W/O US: CPT | Mod: LT

## 2024-01-03 NOTE — PROCEDURE
Case Management Assessment & Discharge Planning Note    Patient name Debbie Aguayo  Location /-16 MRN [id number]  : 1945 Date 2022       Current Admission Date: 2022  Current Admission Diagnosis:COVID-19   Patient Active Problem List    Diagnosis Date Noted    Pressure injury of buttock, stage 2 ([facility] ) 2022    Failure to thrive in adult 2022    COVID-19 2022    Sacral ulcer ([facility] ) 2022    UTI (urinary tract infection) 2022    RICARDO (acute kidney injury) ([facility] ) 2022    Gastrointestinal stromal tumor (GIST) [facility] ) 2022    Type 2 diabetes mellitus with chronic kidney disease ([facility] ) 2021    Type 2 diabetes mellitus with diabetic peripheral angiopathy without gangrene ([facility] ) 2021    Person consulting for explanation of examination or test finding 2021    Embolism and thrombosis of arteries of the lower extremities ([facility] ) 2021    Depression, recurrent ([facility] ) 2021    Obesity, morbid ([facility] ) 2021    Stage 4 chronic kidney disease ([facility] ) 2020    Hypertensive kidney disease with stage 4 chronic kidney disease ([facility] ) 2020    Hyperuricemia 2020    Edema of left lower extremity 2020    Cervical radiculopathy 2020    Encounter for removal of ureteral stent 2020    Malignant neoplasm of overlapping sites of bladder ([facility] ) 2020    Urinary frequency 04/15/2020    Stenosis of left anterior descending artery Mid LAD 50-60% stenosis 2020    Right coronary artery occlusion (HCC)total occlusion of proximal RCA with collateral circ 2020    Pulmonary nodule 7 mm groundglass opacity in the right upper lobe, unchanged since 2020    Atherosclerosis of native arteries of extremities with intermittent claudication, bilateral legs ([facility] ) 2020    Asymptomatic bilateral carotid artery stenosis 2020    Hypertensive kidney disease with stage [de-identified] : Euflexxa # 1 Left Knee Discussed at length with the patient the planned Euflexxa injection. The risks, benefits, convalescence and alternatives were reviewed. The possible side effects discussed included but were not limited to: pain, swelling, heat and redness. There symptoms are generally mild but if they are extensive then contact the office. Giving pain relievers by mouth such as NSAIDs or Tylenol can generally treat the reactions to Euflexxa. Rare cases of infection have been noted. Rash, hives and itching may occur post injection. If you have muscle pain or cramps, flushing and or swelling of the face, rapid heart beat, nausea, dizziness, fever, chills, headache, difficulty breathing, swelling in the arms or legs, or have a prickly feeling of your skin, contact a health care provider immediately.  Following this discussion, the knee was prepped with betadine and under sterile condition the Euflexxa injection was performed with a 22 gauge needle. The needle was introduced into the joint, aspiration was performed to ensure intra-articular placement and the medication was injected. Upon withdrawal of the needle the site was cleaned with alcohol and a bandaid applied. The patient tolerated the injection well and there were no adverse effects. Post injection instructions included no strenuous activity for 24 hours, cryotherapy and if there are any adverse effects to contact the office. 3 chronic kidney disease ([facility] ) 02/25/2020    Initial Medicare annual wellness visit 02/05/2020    Abdominal bruit 01/22/2020    Femoral artery stenosis, right (HCC) 50-75% stenosis in the common femoral artery  01/14/2020    Mesenteric artery stenosis ([facility] ) 01/14/2020    Positive cardiac stress test  small, mildly severe, partially reversible myocardial perfusion defect of anterior and inferior wall  11/12/2019    Abnormal CT of the chest suspicious for an infectious or inflammatory bronchiolitis   11/07/2019    Celiac artery stenosis (HCC) >70% stenosis in the celiac trunk 11/05/2019    Median arcuate ligament syndrome (HCC) 11/05/2019    Decreased pulses in feet 10/22/2019    Refused influenza vaccine 10/22/2019    Immunization not carried out because of patient refusal 10/22/2019    Other proteinuria 07/01/2019    Atherosclerosis of renal artery ([facility] ) 07/01/2019    Hypercalcemia 06/25/2019    Acute drug-induced gout of left foot 04/02/2019    Hypokalemia 03/07/2019    Aortoiliac stenosis, left ([address] ) 02/25/2019    Spondylosis of lumbar region without myelopathy or radiculopathy 01/29/2019    Lumbosacral spondylosis without myelopathy 01/29/2019    Statin intolerance 01/22/2019    Lumbar disc herniation 01/22/2019    Pain of left lower extremity 01/22/2019    Abnormal EKG 01/22/2019    Herniated lumbar intervertebral disc 01/22/2019    Spinal stenosis 09/19/2018    Tobacco use disorder 05/07/2018    Breast mass 12/04/2017    Chronic GERD 12/04/2017    Stage 3 chronic kidney disease ([address] ) 12/04/2017    Claudication in peripheral vascular disease ([address] ) 12/04/2017    Gout 12/04/2017    Hx-TIA (transient ischemic attack) 12/04/2017    Hyperlipidemia associated with type 2 diabetes mellitus ([address] ) 12/04/2017    Hypertension associated with diabetes ([address] ) 12/04/2017    Osteoarthritis 12/04/2017    Obesity (BMI 30-39 9) 12/04/2017    Right renal artery stenosis ([address] ) 12/04/2017    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    Neurodermatitis 12/04/2017    Obesity with body mass index 30 or greater 12/04/2017    Hyperlipidemia, unspecified 12/04/2017    Endometriosis 03/29/2017    Vitamin D deficiency 09/08/2016    Basilar artery stenosis 06/22/2016    Type 2 diabetes mellitus with diabetic nephropathy ([facility] ) 12/19/2015    Hypercholesteremia 12/19/2015    Hypertension 12/19/2015    Polyneuropathy 12/19/2015    Cerebrovascular accident (CVA) ([facility] ) 11/09/2015      LOS (days): 3  Geometric Mean LOS (GMLOS) (days):   Days to GMLOS:     OBJECTIVE:    Risk of Unplanned Readmission Score: 21   Bundled Patient Payment: No Current Bundle     Current admission status: Inpatient       Preferred Pharmacy:   [address], [place] [phone] ()  [facility]  Phone: [phone] Fax: [phone]    [id number] [place] - [address] [facility] [address]  Phone: [phone] Fax: [phone]    Primary Care Provider: GRACIELA Wood    Primary Insurance: [employer] [facility]  Secondary Insurance:     ASSESSMENT:  [facility], [address] [place] [address] Representative - Spouse   Primary Phone: [phone] (Mobile)               Advance Directives  Does patient have a [address] [place] [address] Avenue?: No  Was patient offered paperwork?: Yes  Does patient currently have a Health Care decision maker?: Yes, please see Health Care Proxy section  Does patient have Advance Directives?: No  Was patient offered paperwork?: Yes  Primary Contact: Jefe Parnell (Spouse) [phone] (Mobile         Readmission Root Cause  30 Day Readmission: No    Patient Information  Admitted from[de-identified] Home  Mental Status: Alert  During Assessment patient was accompanied by: Not accompanied during assessment  Assessment information provided by[de-identified] Patient  Primary Caregiver: Self  Support Systems: [facility] Residence: [place] [address] do you live in?: [place]  Home entry access options   Select all that apply : Ramp  Type of Current Residence: [place]  In the last 12 months, was there a time when you were not able to pay the mortgage or rent on time?: No  In the last 12 months, how many places have you lived?: 1  In the last 12 months, was there a time when you did not have a steady place to sleep or slept in a shelter (including now)?: No  Homeless/housing insecurity resource given?: No  Living Arrangements: Lives w/ Spouse/significant other    Activities of Daily Living Prior to Admission  Functional Status: Independent  Completes ADLs independently?: Yes  Ambulates independently?: Yes  Does patient use assisted devices?: Yes  Assisted Devices (DME) used: [employer]  Does patient currently own DME?: Yes  What DME does the patient currently own?: [employer]  Does patient have a history of Outpatient Therapy (PT/OT)?: No  Does the patient have a history of Short-Term Rehab?: No  Does patient have a history of HHC?: No  Does patient currently have [employer] ?: No         Patient Information Continued  Income Source: Pension/correction  Does patient have prescription coverage?: Yes  Within the past 12 months, you worried that your food would run out before you got the money to buy more : Never true  Within the past 12 months, the food you bought just didnt last and you didnt have money to get more : Never true  Food insecurity resource given?: No  Does patient receive dialysis treatments?: No  Does patient have a history of substance abuse?: No  Does patient have a history of Mental Health Diagnosis?: No         Means of Transportation  [facility] Family transport  In the past 12 months, has lack of transportation kept you from medical appointments or from getting medications?: No  In the past 12 months, has lack of transportation kept you from meetings, work, or from getting things needed for daily living?: No  Was application for public transport provided?: No        DISCHARGE DETAILS:    Discharge planning discussed with[de-identified] patient at bedside  Freedom of Choice: Yes  Comments - Freedom of Choice: patient choice considered with dcp  CM contacted family/caregiver?: Yes  Were Treatment Team discharge recommendations reviewed with patient/caregiver?: Yes  Did patient/caregiver verbalize understanding of patient care needs?: Yes       Contacts  Patient Contacts: Kamila Grace (Spouse) [phone] (Mobile  Relationship to Patient[de-identified] Family  Contact Method: Phone  Phone Number: Kamila Grace (Spouse) [phone] (Mobile  Reason/Outcome: Emergency [address]         Is the patient interested in [facility] at discharge?: Yes  [address] requested[de-identified] [address] Name[de-identified] [facility] Provider[de-identified] PCP  Home Health Services Needed[de-identified] Gait/ADL Training,Evaluate Functional Status and Safety  Homebound Criteria Met[de-identified] Requires the Assistance of Another Person for Safe Ambulation or to Leave the Home  Supporting Clincal Findings[de-identified] Limited Endurance    DME Referral Provided  Referral made for DME?: No (patient has walker)    Other Referral/Resources/Interventions Provided:  Interventions: [facility]  Referral Comments: referrals to [facility] sent for 30 mile radius         Treatment Team Recommendation: Home with [address] [employer]  Discharge Destination Plan[de-identified] Home with [address] [employer]                                IMM Given (Date):: 01/25/22  IMM Given to[de-identified] Patient

## 2024-01-04 LAB
ALBUMIN MFR SERPL ELPH: 44.6 %
ALBUMIN SERPL-MCNC: 3.7 G/DL
ALBUMIN/GLOB SERPL: 0.8 RATIO
ALPHA1 GLOB MFR SERPL ELPH: 3.8 %
ALPHA1 GLOB SERPL ELPH-MCNC: 0.3 G/DL
ALPHA2 GLOB MFR SERPL ELPH: 12.2 %
ALPHA2 GLOB SERPL ELPH-MCNC: 1 G/DL
B-GLOBULIN MFR SERPL ELPH: 11.8 %
B-GLOBULIN SERPL ELPH-MCNC: 1 G/DL
DEPRECATED KAPPA LC FREE/LAMBDA SER: 1.8 RATIO
GAMMA GLOB FLD ELPH-MCNC: 2.3 G/DL
GAMMA GLOB MFR SERPL ELPH: 27.6 %
IGA SER QL IEP: 294 MG/DL
IGG SER QL IEP: 2323 MG/DL
IGM SER QL IEP: 156 MG/DL
INTERPRETATION SERPL IEP-IMP: NORMAL
KAPPA LC CSF-MCNC: 4 MG/DL
KAPPA LC SERPL-MCNC: 7.21 MG/DL
M PROTEIN MFR SERPL ELPH: NORMAL
M PROTEIN SPEC IFE-MCNC: NORMAL
MONOCLON BAND OBS SERPL: NORMAL
PROT SERPL-MCNC: 8.3 G/DL
PROT SERPL-MCNC: 8.3 G/DL

## 2024-01-06 DIAGNOSIS — M25.561 PAIN IN RIGHT KNEE: ICD-10-CM

## 2024-01-08 ENCOUNTER — APPOINTMENT (OUTPATIENT)
Dept: ORTHOPEDIC SURGERY | Facility: CLINIC | Age: 89
End: 2024-01-08
Payer: MEDICARE

## 2024-01-08 VITALS — WEIGHT: 155 LBS | HEIGHT: 65 IN | BODY MASS INDEX: 25.83 KG/M2

## 2024-01-08 PROCEDURE — 73564 X-RAY EXAM KNEE 4 OR MORE: CPT | Mod: RT

## 2024-01-08 PROCEDURE — 99213 OFFICE O/P EST LOW 20 MIN: CPT

## 2024-01-08 NOTE — DISCUSSION/SUMMARY
[de-identified] : Discussed at length the nature of the patient's condition. Patient's B/L knee symptoms are secondary to degenerative arthritis. While I believe she would eventually benefit from B/L TKRs, she is hesitant to have surgery at this time. Therefore, we will continue nonoperatively. We will seek authorization for B/L knee viscosupplementation injections. Once we have authorization, we will proceed accordingly.  Of note, she is also under the care of her rheumatologist.   Patient can continue home exercises, Ibuprofen, weight management and activities as tolerated. All questions were answered, understanding verbalized. Patient is in agreement with plan of treatment.

## 2024-01-08 NOTE — PHYSICAL EXAM
[de-identified] : General appearance: well-nourished and hydrated, pleasant, alert and oriented x 3, cooperative. HEENT: Normocephalic, EOM intact, Nasal septum midline, Oral cavity clear, External auditory canal clear. Cardiovascular: no apparent abnormalities, no lower leg edema, no varicosities, pedal pulses are palpable. Lymphatics Lymph nodes: none palpated, Lymphedema: not present. Neurologic: sensation is normal, no muscle weakness in upper or lower extremities, patella tendon reflexes intact. Dermatologic no apparent skin lesions, moist, warm, no rash. Spine: cervical spine appears normal and moves freely, thoracic spine appears normal and moves freely, lumbosacral spine appears normal and moves freely. Gait: nonantalgic.   Right Knee Inspection: trace effusion. Wounds: none. Alignment: normal. Palpation: medial and lateral tenderness on palpation. ROM: Active (in degrees): 10 degrees of flexion contracture, 10-90 with pain and crepitus through arc of motion. Ligamentous laxity (neg): all ligaments appear stable, negative ant. drawer test, negative post. drawer test, stable to varus stress test, stable to valgus stress test, negative Lachman's test, negative pivot shift test, Meniscal Test: negative McMurrays, negative Aydee. Patellofemoral Alignment Test: Q angle-, normal. Muscle Test: good quad strength. Leg examination: calf is soft and non-tender.   Left Knee Inspection: trace effusion. Wounds: none. Alignment: normal. Palpation: medial and lateral tenderness on palpation. ROM: Active (in degrees): 10 degrees of flexion contracture, 10-90 with pain and crepitus through arc of motion. Ligamentous laxity (neg): all ligaments appear stable, negative ant. drawer test, negative post. drawer test, stable to varus stress test, stable to valgus stress test, negative Lachman's test, negative pivot shift test, Meniscal Test: negative McMurrays, negative Aydee. Patellofemoral Alignment Test: Q angle-, normal. Muscle Test: good quad strength. Leg examination: calf is soft and non-tender. [de-identified] : Right knee x-rays, standing AP/Lateral and Merchant films, and 45-degree PA standing view, taken at the office today shows diffuse degenerative arthritis, lateral joint space narrowing, medial bone on bone sclerosis, patella at appropriate height and central position, small osteophytes, Kellgren and Lance grade 3-4.  Left knee x-ray merchant view taken at the office today demonstrates mild joint space narrowing and a well centered patella. Generalized osteopenia.

## 2024-01-08 NOTE — ADDENDUM
[FreeTextEntry1] : This note was written by Will Barlow on 01/08/2024 acting as scribe for Dr. Rene SOFIA I, Dr. Rene Pichardo, have read and attest that all the information, medical decision making and discharge instructions within are true and accurate.   This note was written by Real Hancock on 01/08/2024 acting as scribe for Dr. Rene SOFIA I, Dr. Rene Pichardo, have read and attest that all the information, medical decision making and discharge instructions within are true and accurate.

## 2024-01-08 NOTE — HISTORY OF PRESENT ILLNESS
[de-identified] : MAVIS SEPULVEDA  88 year old female presents for evaluation of right knee pain going on for two years. She does note pain is sharp and diffused in nature with associated swelling, buckling and locking. She has difficulty walking and getting up from seated positions which she ordered a cane to help with standing. She takes Ibuprofen 800mg as needed. She had steroid injections 6 months ago and started Euflexxa injections on the left knee 1 week ago. The patient would like to start injections on the right knee. She has a PMHx of HTN, DCIS (in remission), RA which her rheumatologist is planning on starting her on an oral steroid, thoracic aortic aneurysm, benign lung mass and tremors.  Allergies: Amoxicillin, Keflex, Levaquin, Penicillin, and Sulfa

## 2024-01-08 NOTE — CONSULT LETTER
[Dear  ___] : Dear  [unfilled], [Consult Letter:] : I had the pleasure of evaluating your patient, [unfilled]. [Please see my note below.] : Please see my note below. [Consult Closing:] : Thank you very much for allowing me to participate in the care of this patient.  If you have any questions, please do not hesitate to contact me. [Sincerely,] : Sincerely, [FreeTextEntry2] : MARYSOL RICE

## 2024-01-10 ENCOUNTER — APPOINTMENT (OUTPATIENT)
Dept: ORTHOPEDIC SURGERY | Facility: CLINIC | Age: 89
End: 2024-01-10
Payer: MEDICARE

## 2024-01-10 PROCEDURE — 20610 DRAIN/INJ JOINT/BURSA W/O US: CPT | Mod: 50

## 2024-01-10 NOTE — PROCEDURE
[de-identified] : Euflexxa #2 Left knee Discussed at length with the patient the planned Euflexxa injection. The risks, benefits, convalescence and alternatives were reviewed. The possible side effects discussed included but were not limited to: pain, swelling, heat and redness. There symptoms are generally mild but if they are extensive then contact the office. Giving pain relievers by mouth such as NSAIDs or Tylenol can generally treat the reactions to Euflexxa. Rare cases of infection have been noted. Rash, hives and itching may occur post injection. If you have muscle pain or cramps, flushing and or swelling of the face, rapid heart beat, nausea, dizziness, fever, chills, headache, difficulty breathing, swelling in the arms or legs, or have a prickly feeling of your skin, contact a health care provider immediately.  Following this discussion, the knee was prepped with betadine and under sterile condition the Euflexxa injection was performed with a 22 gauge needle. The needle was introduced into the joint, aspiration was performed to ensure intra-articular placement and the medication was injected. Upon withdrawal of the needle the site was cleaned with alcohol and a bandaid applied. The patient tolerated the injection well and there were no adverse effects. Post injection instructions included no strenuous activity for 24 hours, cryotherapy and if there are any adverse effects to contact the office.  Euflexxa #1 Right knee Discussed at length with the patient the planned Euflexxa injection. The risks, benefits, convalescence and alternatives were reviewed. The possible side effects discussed included but were not limited to: pain, swelling, heat and redness. There symptoms are generally mild but if they are extensive then contact the office. Giving pain relievers by mouth such as NSAIDs or Tylenol can generally treat the reactions to Euflexxa. Rare cases of infection have been noted. Rash, hives and itching may occur post injection. If you have muscle pain or cramps, flushing and or swelling of the face, rapid heart beat, nausea, dizziness, fever, chills, headache, difficulty breathing, swelling in the arms or legs, or have a prickly feeling of your skin, contact a health care provider immediately.  Following this discussion, the knee was prepped with betadine and under sterile condition the Euflexxa injection was performed with a 22 gauge needle. The needle was introduced into the joint, aspiration was performed to ensure intra-articular placement and the medication was injected. Upon withdrawal of the needle the site was cleaned with alcohol and a bandaid applied. The patient tolerated the injection well and there were no adverse effects. Post injection instructions included no strenuous activity for 24 hours, cryotherapy and if there are any adverse effects to contact the office.

## 2024-01-17 ENCOUNTER — APPOINTMENT (OUTPATIENT)
Dept: ORTHOPEDIC SURGERY | Facility: CLINIC | Age: 89
End: 2024-01-17
Payer: MEDICARE

## 2024-01-17 PROCEDURE — 20610 DRAIN/INJ JOINT/BURSA W/O US: CPT | Mod: 50

## 2024-01-18 NOTE — PROCEDURE
[de-identified] : Euflexxa # 3 Left knee, # 2 Right knee Discussed at length with the patient the planned Euflexxa injection. The risks, benefits, convalescence and alternatives were reviewed. The possible side effects discussed included but were not limited to: pain, swelling, heat and redness. There symptoms are generally mild but if they are extensive then contact the office. Giving pain relievers by mouth such as NSAIDs or Tylenol can generally treat the reactions to Euflexxa. Rare cases of infection have been noted. Rash, hives and itching may occur post injection. If you have muscle pain or cramps, flushing and or swelling of the face, rapid heart beat, nausea, dizziness, fever, chills, headache, difficulty breathing, swelling in the arms or legs, or have a prickly feeling of your skin, contact a health care provider immediately.  Following this discussion, the knee was prepped with betadine and under sterile condition the Euflexxa injection was performed with a 22 gauge needle. The needle was introduced into the joint, aspiration was performed to ensure intra-articular placement and the medication was injected. Upon withdrawal of the needle the site was cleaned with alcohol and a bandaid applied. The patient tolerated the injection well and there were no adverse effects. Post injection instructions included no strenuous activity for 24 hours, cryotherapy and if there are any adverse effects to contact the office.

## 2024-01-18 NOTE — PROCEDURE
[de-identified] : Euflexxa # 3 Left knee, # 2 Right knee Discussed at length with the patient the planned Euflexxa injection. The risks, benefits, convalescence and alternatives were reviewed. The possible side effects discussed included but were not limited to: pain, swelling, heat and redness. There symptoms are generally mild but if they are extensive then contact the office. Giving pain relievers by mouth such as NSAIDs or Tylenol can generally treat the reactions to Euflexxa. Rare cases of infection have been noted. Rash, hives and itching may occur post injection. If you have muscle pain or cramps, flushing and or swelling of the face, rapid heart beat, nausea, dizziness, fever, chills, headache, difficulty breathing, swelling in the arms or legs, or have a prickly feeling of your skin, contact a health care provider immediately.  Following this discussion, the knee was prepped with betadine and under sterile condition the Euflexxa injection was performed with a 22 gauge needle. The needle was introduced into the joint, aspiration was performed to ensure intra-articular placement and the medication was injected. Upon withdrawal of the needle the site was cleaned with alcohol and a bandaid applied. The patient tolerated the injection well and there were no adverse effects. Post injection instructions included no strenuous activity for 24 hours, cryotherapy and if there are any adverse effects to contact the office.

## 2024-01-24 ENCOUNTER — APPOINTMENT (OUTPATIENT)
Dept: ORTHOPEDIC SURGERY | Facility: CLINIC | Age: 89
End: 2024-01-24
Payer: MEDICARE

## 2024-01-24 DIAGNOSIS — M17.0 BILATERAL PRIMARY OSTEOARTHRITIS OF KNEE: ICD-10-CM

## 2024-01-24 PROCEDURE — 20610 DRAIN/INJ JOINT/BURSA W/O US: CPT | Mod: RT

## 2024-01-29 NOTE — PROCEDURE
[de-identified] : Euflexxa # 3 Right knee Discussed at length with the patient the planned Euflexxa injection. The risks, benefits, convalescence and alternatives were reviewed. The possible side effects discussed included but were not limited to: pain, swelling, heat and redness. There symptoms are generally mild but if they are extensive then contact the office. Giving pain relievers by mouth such as NSAIDs or Tylenol can generally treat the reactions to Euflexxa. Rare cases of infection have been noted. Rash, hives and itching may occur post injection. If you have muscle pain or cramps, flushing and or swelling of the face, rapid heart beat, nausea, dizziness, fever, chills, headache, difficulty breathing, swelling in the arms or legs, or have a prickly feeling of your skin, contact a health care provider immediately.  Following this discussion, the knee was prepped with betadine and under sterile condition the Euflexxa injection was performed with a 22 gauge needle. The needle was introduced into the joint, aspiration was performed to ensure intra-articular placement and the medication was injected. Upon withdrawal of the needle the site was cleaned with alcohol and a bandaid applied. The patient tolerated the injection well and there were no adverse effects. Post injection instructions included no strenuous activity for 24 hours, cryotherapy and if there are any adverse effects to contact the office.

## 2024-06-11 ENCOUNTER — OUTPATIENT (OUTPATIENT)
Dept: OUTPATIENT SERVICES | Facility: HOSPITAL | Age: 89
LOS: 1 days | End: 2024-06-11
Payer: MEDICARE

## 2024-06-11 ENCOUNTER — APPOINTMENT (OUTPATIENT)
Dept: CT IMAGING | Facility: CLINIC | Age: 89
End: 2024-06-11
Payer: MEDICARE

## 2024-06-11 DIAGNOSIS — Z00.8 ENCOUNTER FOR OTHER GENERAL EXAMINATION: ICD-10-CM

## 2024-06-11 DIAGNOSIS — H26.9 UNSPECIFIED CATARACT: Chronic | ICD-10-CM

## 2024-06-11 PROCEDURE — 71250 CT THORAX DX C-: CPT | Mod: 26,MH

## 2024-06-11 PROCEDURE — 71250 CT THORAX DX C-: CPT

## 2024-06-19 ENCOUNTER — OUTPATIENT (OUTPATIENT)
Dept: OUTPATIENT SERVICES | Facility: HOSPITAL | Age: 89
LOS: 1 days | Discharge: HOME | End: 2024-06-19

## 2024-06-19 DIAGNOSIS — H26.9 UNSPECIFIED CATARACT: Chronic | ICD-10-CM

## 2024-06-19 DIAGNOSIS — C50.919 MALIGNANT NEOPLASM OF UNSPECIFIED SITE OF UNSPECIFIED FEMALE BREAST: ICD-10-CM

## 2024-06-21 ENCOUNTER — APPOINTMENT (OUTPATIENT)
Dept: HEMATOLOGY ONCOLOGY | Facility: CLINIC | Age: 89
End: 2024-06-21
Payer: MEDICARE

## 2024-06-21 VITALS
RESPIRATION RATE: 16 BRPM | TEMPERATURE: 98.3 F | BODY MASS INDEX: 25.53 KG/M2 | DIASTOLIC BLOOD PRESSURE: 80 MMHG | OXYGEN SATURATION: 97 % | HEART RATE: 79 BPM | WEIGHT: 153.44 LBS | SYSTOLIC BLOOD PRESSURE: 136 MMHG

## 2024-06-21 DIAGNOSIS — C50.919 MALIGNANT NEOPLASM OF UNSPECIFIED SITE OF UNSPECIFIED FEMALE BREAST: ICD-10-CM

## 2024-06-21 DIAGNOSIS — R91.8 OTHER NONSPECIFIC ABNORMAL FINDING OF LUNG FIELD: ICD-10-CM

## 2024-06-21 DIAGNOSIS — B02.29 OTHER POSTHERPETIC NERVOUS SYSTEM INVOLVEMENT: ICD-10-CM

## 2024-06-21 PROCEDURE — G2211 COMPLEX E/M VISIT ADD ON: CPT

## 2024-06-21 PROCEDURE — 99214 OFFICE O/P EST MOD 30 MIN: CPT

## 2024-06-21 RX ORDER — GABAPENTIN 100 MG/1
100 CAPSULE ORAL
Qty: 30 | Refills: 2 | Status: ACTIVE | COMMUNITY
Start: 2024-06-21 | End: 1900-01-01

## 2024-06-21 RX ORDER — ANASTROZOLE TABLETS 1 MG/1
1 TABLET ORAL
Qty: 90 | Refills: 1 | Status: ACTIVE | COMMUNITY
Start: 2020-10-23 | End: 1900-01-01

## 2024-06-21 NOTE — PHYSICAL EXAM
[Ambulatory and capable of all self care but unable to carry out any work activities] : Status 2- Ambulatory and capable of all self care but unable to carry out any work activities. Up and about more than 50% of waking hours [Normal] : affect appropriate [de-identified] : breast lumpectomy; no abnl masses or palpable axillary LN  [de-identified] : resting tremors over the BUE; strength 4/5 BUE and BLE

## 2024-06-21 NOTE — HISTORY OF PRESENT ILLNESS
[Disease: _____________________] : Disease: [unfilled] [T: ___] : T[unfilled] [N: ___] : N[unfilled] [AJCC Stage: ____] : AJCC Stage: [unfilled] [de-identified] : Age 85: left breast cancer She had been followed for ascending aorta aneurysm with CT imaging. CT from 7/16/2020 showed 4 cm ascending aorta, new 1.9 cm left breast mass, and mild multifocal tree in bud type nodularity in the lungs, and 4 mm left lower lobe nodule. She had not been having her annual mammograms for years. Diagnostic mammogram done on 7/27/2020 showed heterogeneously dense breasts with 2.5 cm oval lobulated and spiculated high density mass in the left breast upper outer quadrant middle depth.  The breast biopsy showed invasive moderately differentiated ductal carcinoma ER %, VT 41-50%, [id number] negative and Ki67 of 30% and DCIS. She underwent left lumpectomy with sentinel lymph node biopsy with Dr Hurtado on 9/24/2020. The pathology showed invasive ductal carcinoma, moderately differentiated measuring 3.4 cm metastatic to 1 out of 4 lymph nodes (invasive component 1 cm). Oncotype had low score of 13 and data from [facility] used: recommended endocrine therapy for postmenopausal patient. She completed RT to the surgical site. Anastrozole was continued. She had CT chest to follow up ascending aortic dilatation and had new lung lesion noted: seen by Dr Lisker and had biopsy done which was benign along with Pet/ CT done in August: pet avid lung lesion. She has been having CT follow up of the chest and has not agreed to another biopsy.  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 Stable ascending aorta. Has been having congestion over the L ear and has been to see many different ENTs who have not addressed the congestion/ hearing loss L ear. She has been taking flonase and claritin but still remains congested. She is wondering if she could see ENT at [facility]. She denies any new cough or SOB. She wonders if the pleural apical lesion could be from past RT. She denies any new breast or chest wall pain or changes. She saw orthopedics for B knee pain and completed course of PT but still with B knee pain: had fluid removed from knees by rheumatologist and taking ibuprofen 800mg once to twice a day to help with symptoms. She does not want to have blood work done today: had done in December. Having more pain over the R scapula: had past shingles and even though no rash: has persistent burning sensation.

## 2024-06-21 NOTE — CONSULT LETTER
[Dear  ___] : Dear  [unfilled], [Courtesy Letter:] : I had the pleasure of seeing your patient, [unfilled], in my office today. [Please see my note below.] : Please see my note below. [Consult Closing:] : Thank you very much for allowing me to participate in the care of this patient.  If you have any questions, please do not hesitate to contact me. [Sincerely,] : Sincerely, [FreeTextEntry2] : Jose Juan Fu MD [facility] [place] [address] [FreeTextEntry3] : Walker Smyth MD Attending [facility] [[email or web address]]

## 2024-06-21 NOTE — ASSESSMENT
[FreeTextEntry1] : She is a 88 y/o  F s/p lumpectomy with sentinel lymph node biopsy T2N1 that is ER/ OK positive [id number] negative. She has been on anastrozole since 11/2020. She has no new signs or symptoms of breast cancer recurrence. She will continue with anastrozole. She is up to date with exam without any new findings. Next follow up in 6 months but earlier if any new symptoms.   Sinus congestion: she has been on flonase and claritin; L tympnanic membrane opacified and will have ENT at [facility].   Postherpetic neuralgia: reviewed options: reviewed gabapentin low dose given age: 100mg at bedtime: reviewed potential for dizziness and drowsiness. She understands if any intolerable AE, she would stop medication.   Aortic aneurysm: reviewed follow up CT and copy given to her: stable.   Pleural lung lesion: R: apical lesion stable: she prefers no surgery and no biopsy and would not want to be treated given age. She remains asymptomatic.   IgG kappa faint band from 7/2023 work up: last blood work done 12/2023 stable.

## 2024-07-15 ENCOUNTER — NON-APPOINTMENT (OUTPATIENT)
Age: 89
End: 2024-07-15

## 2024-07-16 ENCOUNTER — APPOINTMENT (OUTPATIENT)
Dept: OTOLARYNGOLOGY | Facility: CLINIC | Age: 89
End: 2024-07-16
Payer: MEDICARE

## 2024-07-16 VITALS
DIASTOLIC BLOOD PRESSURE: 84 MMHG | HEIGHT: 65 IN | HEART RATE: 90 BPM | BODY MASS INDEX: 25.33 KG/M2 | SYSTOLIC BLOOD PRESSURE: 158 MMHG | OXYGEN SATURATION: 96 % | WEIGHT: 152 LBS

## 2024-07-16 DIAGNOSIS — R09.81 NASAL CONGESTION: ICD-10-CM

## 2024-07-16 DIAGNOSIS — M95.0 ACQUIRED DEFORMITY OF NOSE: ICD-10-CM

## 2024-07-16 DIAGNOSIS — H90.3 SENSORINEURAL HEARING LOSS, BILATERAL: ICD-10-CM

## 2024-07-16 PROCEDURE — 92567 TYMPANOMETRY: CPT

## 2024-07-16 PROCEDURE — 92557 COMPREHENSIVE HEARING TEST: CPT

## 2024-07-16 PROCEDURE — 99204 OFFICE O/P NEW MOD 45 MIN: CPT | Mod: 25

## 2024-07-16 PROCEDURE — 31231 NASAL ENDOSCOPY DX: CPT

## 2024-10-01 PROBLEM — J34.829 COLLAPSE OF NASAL VALVE: Status: ACTIVE | Noted: 2024-07-16

## 2024-12-06 ENCOUNTER — OUTPATIENT (OUTPATIENT)
Dept: OUTPATIENT SERVICES | Facility: HOSPITAL | Age: 89
LOS: 1 days | Discharge: HOME | End: 2024-12-06

## 2024-12-06 DIAGNOSIS — H26.9 UNSPECIFIED CATARACT: Chronic | ICD-10-CM

## 2024-12-06 DIAGNOSIS — C50.919 MALIGNANT NEOPLASM OF UNSPECIFIED SITE OF UNSPECIFIED FEMALE BREAST: ICD-10-CM

## 2024-12-16 ENCOUNTER — APPOINTMENT (OUTPATIENT)
Dept: HEMATOLOGY ONCOLOGY | Facility: CLINIC | Age: 89
End: 2024-12-16
Payer: MEDICARE

## 2024-12-16 ENCOUNTER — NON-APPOINTMENT (OUTPATIENT)
Age: 89
End: 2024-12-16

## 2024-12-16 VITALS
HEART RATE: 80 BPM | SYSTOLIC BLOOD PRESSURE: 159 MMHG | DIASTOLIC BLOOD PRESSURE: 93 MMHG | BODY MASS INDEX: 24.95 KG/M2 | OXYGEN SATURATION: 97 % | RESPIRATION RATE: 16 BRPM | WEIGHT: 149.89 LBS | TEMPERATURE: 97.2 F

## 2024-12-16 DIAGNOSIS — C50.919 MALIGNANT NEOPLASM OF UNSPECIFIED SITE OF UNSPECIFIED FEMALE BREAST: ICD-10-CM

## 2024-12-16 DIAGNOSIS — D47.2 MONOCLONAL GAMMOPATHY: ICD-10-CM

## 2024-12-16 PROCEDURE — 99214 OFFICE O/P EST MOD 30 MIN: CPT

## 2024-12-16 PROCEDURE — G2211 COMPLEX E/M VISIT ADD ON: CPT

## 2025-01-03 ENCOUNTER — APPOINTMENT (OUTPATIENT)
Dept: MAMMOGRAPHY | Facility: CLINIC | Age: 89
End: 2025-01-03
Payer: MEDICARE

## 2025-01-03 ENCOUNTER — APPOINTMENT (OUTPATIENT)
Dept: ULTRASOUND IMAGING | Facility: CLINIC | Age: 89
End: 2025-01-03
Payer: MEDICARE

## 2025-01-03 ENCOUNTER — RESULT REVIEW (OUTPATIENT)
Age: 89
End: 2025-01-03

## 2025-01-03 ENCOUNTER — OUTPATIENT (OUTPATIENT)
Dept: OUTPATIENT SERVICES | Facility: HOSPITAL | Age: 89
LOS: 1 days | End: 2025-01-03
Payer: MEDICARE

## 2025-01-03 DIAGNOSIS — H26.9 UNSPECIFIED CATARACT: Chronic | ICD-10-CM

## 2025-01-03 DIAGNOSIS — Z00.8 ENCOUNTER FOR OTHER GENERAL EXAMINATION: ICD-10-CM

## 2025-01-03 PROCEDURE — 76641 ULTRASOUND BREAST COMPLETE: CPT

## 2025-01-03 PROCEDURE — G0279: CPT | Mod: 26

## 2025-01-03 PROCEDURE — 77066 DX MAMMO INCL CAD BI: CPT

## 2025-01-03 PROCEDURE — 76641 ULTRASOUND BREAST COMPLETE: CPT | Mod: 26,50,GA

## 2025-01-03 PROCEDURE — G0279: CPT

## 2025-01-03 PROCEDURE — 77066 DX MAMMO INCL CAD BI: CPT | Mod: 26

## 2025-01-07 ENCOUNTER — APPOINTMENT (OUTPATIENT)
Dept: SURGICAL ONCOLOGY | Facility: CLINIC | Age: 89
End: 2025-01-07
Payer: MEDICARE

## 2025-01-07 ENCOUNTER — RESULT REVIEW (OUTPATIENT)
Age: 89
End: 2025-01-07

## 2025-01-07 DIAGNOSIS — C50.919 MALIGNANT NEOPLASM OF UNSPECIFIED SITE OF UNSPECIFIED FEMALE BREAST: ICD-10-CM

## 2025-01-07 PROCEDURE — 99213 OFFICE O/P EST LOW 20 MIN: CPT

## 2025-06-19 ENCOUNTER — OUTPATIENT (OUTPATIENT)
Dept: OUTPATIENT SERVICES | Facility: HOSPITAL | Age: 89
LOS: 1 days | End: 2025-06-19
Payer: MEDICARE

## 2025-06-19 ENCOUNTER — APPOINTMENT (OUTPATIENT)
Dept: CT IMAGING | Facility: CLINIC | Age: 89
End: 2025-06-19
Payer: MEDICARE

## 2025-06-19 DIAGNOSIS — C50.919 MALIGNANT NEOPLASM OF UNSPECIFIED SITE OF UNSPECIFIED FEMALE BREAST: ICD-10-CM

## 2025-06-19 DIAGNOSIS — R91.8 OTHER NONSPECIFIC ABNORMAL FINDING OF LUNG FIELD: ICD-10-CM

## 2025-06-19 DIAGNOSIS — H26.9 UNSPECIFIED CATARACT: Chronic | ICD-10-CM

## 2025-06-19 PROCEDURE — 71250 CT THORAX DX C-: CPT | Mod: 26

## 2025-06-19 PROCEDURE — 71250 CT THORAX DX C-: CPT

## 2025-06-21 ENCOUNTER — OUTPATIENT (OUTPATIENT)
Dept: OUTPATIENT SERVICES | Facility: HOSPITAL | Age: 89
LOS: 1 days | Discharge: HOME | End: 2025-06-21

## 2025-06-21 DIAGNOSIS — H26.9 UNSPECIFIED CATARACT: Chronic | ICD-10-CM

## 2025-06-25 ENCOUNTER — APPOINTMENT (OUTPATIENT)
Dept: HEMATOLOGY ONCOLOGY | Facility: CLINIC | Age: 89
End: 2025-06-25
Payer: MEDICARE

## 2025-06-25 VITALS
DIASTOLIC BLOOD PRESSURE: 82 MMHG | HEART RATE: 60 BPM | TEMPERATURE: 97.9 F | SYSTOLIC BLOOD PRESSURE: 143 MMHG | WEIGHT: 138.89 LBS | BODY MASS INDEX: 24.92 KG/M2 | HEIGHT: 62.6 IN | RESPIRATION RATE: 14 BRPM

## 2025-06-25 PROCEDURE — G2211 COMPLEX E/M VISIT ADD ON: CPT

## 2025-06-25 PROCEDURE — 99214 OFFICE O/P EST MOD 30 MIN: CPT
